# Patient Record
Sex: FEMALE | Race: WHITE | NOT HISPANIC OR LATINO | Employment: FULL TIME | URBAN - NONMETROPOLITAN AREA
[De-identification: names, ages, dates, MRNs, and addresses within clinical notes are randomized per-mention and may not be internally consistent; named-entity substitution may affect disease eponyms.]

---

## 2018-05-29 DIAGNOSIS — M25.562 LEFT KNEE PAIN, UNSPECIFIED CHRONICITY: Primary | ICD-10-CM

## 2018-05-30 ENCOUNTER — HOSPITAL ENCOUNTER (OUTPATIENT)
Dept: GENERAL RADIOLOGY | Facility: HOSPITAL | Age: 73
Discharge: HOME | End: 2018-05-30
Attending: ORTHOPAEDIC SURGERY

## 2018-05-30 ENCOUNTER — OFFICE VISIT (OUTPATIENT)
Dept: ORTHOPEDIC SURGERY | Facility: CLINIC | Age: 73
End: 2018-05-30

## 2018-05-30 VITALS
WEIGHT: 160 LBS | HEIGHT: 62 IN | DIASTOLIC BLOOD PRESSURE: 76 MMHG | BODY MASS INDEX: 29.44 KG/M2 | HEART RATE: 63 BPM | SYSTOLIC BLOOD PRESSURE: 180 MMHG

## 2018-05-30 DIAGNOSIS — M17.12 PRIMARY OSTEOARTHRITIS OF LEFT KNEE: Primary | ICD-10-CM

## 2018-05-30 DIAGNOSIS — M25.562 LEFT KNEE PAIN, UNSPECIFIED CHRONICITY: ICD-10-CM

## 2018-05-30 PROBLEM — M06.9 RHEUMATOID ARTHRITIS: Status: ACTIVE | Noted: 2018-05-30

## 2018-05-30 PROCEDURE — 20610 DRAIN/INJ JOINT/BURSA W/O US: CPT | Performed by: ORTHOPAEDIC SURGERY

## 2018-05-30 PROCEDURE — 99203 OFFICE O/P NEW LOW 30 MIN: CPT | Performed by: ORTHOPAEDIC SURGERY

## 2018-05-30 RX ORDER — LIDOCAINE HYDROCHLORIDE 20 MG/ML
2 INJECTION, SOLUTION INFILTRATION; PERINEURAL
Status: COMPLETED | OUTPATIENT
Start: 2018-05-30 | End: 2018-05-30

## 2018-05-30 RX ORDER — MELOXICAM 7.5 MG/1
7.5 TABLET ORAL DAILY
Refills: 0 | COMMUNITY
Start: 2018-03-16 | End: 2018-07-11

## 2018-05-30 RX ORDER — LISINOPRIL AND HYDROCHLOROTHIAZIDE 20; 12.5 MG/1; MG/1
1 TABLET ORAL DAILY
Refills: 0 | COMMUNITY
Start: 2018-05-17 | End: 2018-07-19 | Stop reason: HOSPADM

## 2018-05-30 RX ORDER — BRIMONIDINE TARTRATE/TIMOLOL 0.2%-0.5%
DROPS OPHTHALMIC (EYE)
Refills: 0 | COMMUNITY
Start: 2018-04-04 | End: 2018-07-11

## 2018-05-30 RX ORDER — METHYLPREDNISOLONE ACETATE 40 MG/ML
40 INJECTION, SUSPENSION INTRA-ARTICULAR; INTRALESIONAL; INTRAMUSCULAR; SOFT TISSUE
Status: COMPLETED | OUTPATIENT
Start: 2018-05-30 | End: 2018-05-30

## 2018-05-30 RX ADMIN — LIDOCAINE HYDROCHLORIDE 2 ML: 20 INJECTION, SOLUTION INFILTRATION; PERINEURAL at 14:42

## 2018-05-30 RX ADMIN — METHYLPREDNISOLONE ACETATE 40 MG: 40 INJECTION, SUSPENSION INTRA-ARTICULAR; INTRALESIONAL; INTRAMUSCULAR; SOFT TISSUE at 14:42

## 2018-05-30 NOTE — PROGRESS NOTES
New Patient Visit        Patient: Aydee Becker  YOB: 1945  Date of encounter: 5/30/2018      History of Present Illness:   Aydee Becker is a 72 y.o. female who is referred here today by Troi Bruce PA-C for evaluation of left knee pain.  She states the knee pain started about 6-8 weeks ago with no known injury.  She's complaining of anterior and medial knee pain.  She states pain is significantly worse with weightbearing activities and getting up and down from a seated position.  She also has difficulty with stairs.  She does complain of swelling with increased activity.  She denies catching or locking sensation.  She's been recently started on Voltaren gel and meloxicam minimal improvement.    PMH:   Patient Active Problem List   Diagnosis   (none) - all problems resolved or deleted     Past Medical History:   Diagnosis Date   • Hypertension    • Knee pain, left        PSH:  Past Surgical History:   Procedure Laterality Date   • CATARACT EXTRACTION Left        Allergies:   No Known Allergies    Medications:     Current Outpatient Prescriptions:   •  COMBIGAN 0.2-0.5 % ophthalmic solution, , Disp: , Rfl: 0  •  diclofenac (VOLTAREN) 1 % gel gel, APPLY 2 GRAM TO THE AFFECTED AREA 4 TIMES A DAY FOR 30 DAYS, Disp: , Rfl: 0  •  lisinopril-hydrochlorothiazide (PRINZIDE,ZESTORETIC) 20-12.5 MG per tablet, , Disp: , Rfl: 0  •  meloxicam (MOBIC) 7.5 MG tablet, Take 7.5 mg by mouth Daily., Disp: , Rfl: 0  •  sertraline (ZOLOFT) 50 MG tablet, , Disp: , Rfl: 0    Social History:  Social History     Social History   • Marital status:      Spouse name: N/A   • Number of children: N/A   • Years of education: N/A     Occupational History   • Not on file.     Social History Main Topics   • Smoking status: Never Smoker   • Smokeless tobacco: Never Used   • Alcohol use No   • Drug use: No   • Sexual activity: Not on file     Other Topics Concern   • Not on file     Social History Narrative   • No narrative  "on file       Family History:     Family History   Problem Relation Age of Onset   • Rheum arthritis Mother    • Hypertension Mother    • Rheum arthritis Father    • Gout Sister    • Gout Brother        Review of Systems:   Review of Systems   Constitutional: Negative.    HENT: Negative.    Eyes: Negative.    Respiratory: Negative.    Cardiovascular: Negative.    Gastrointestinal: Negative.    Endocrine: Negative.    Genitourinary: Negative.    Musculoskeletal: Positive for arthralgias, gait problem, joint swelling and myalgias.   Skin: Negative.    Hematological: Negative.    Psychiatric/Behavioral: Negative.        Physical Exam: 72 y.o. female  General Appearance:    Alert and oriented x 3, cooperative, in no acute distress                   Vitals:    05/30/18 1317   BP: 180/76   Pulse: 63   Weight: 72.6 kg (160 lb)   Height: 157.5 cm (62\")              Body mass index is 29.26 kg/m².        Musculoskeletal: Examination of the left knee reveals moderate effusion with moderate medial joint line tenderness.  She has full range of motion.  No instability with varus or valgus stressing.  Her neurovascular status is intact.    Radiology:     3 views of the left knee that are nonweightbearing were reviewed revealing mild to moderate osteoarthritic changes most prominent in the medial compartment.    Large Joint Arthrocentesis  Date/Time: 5/30/2018 2:42 PM  Consent given by: patient  Timeout: Immediately prior to procedure a time out was called to verify the correct patient, procedure, equipment, support staff and site/side marked as required   Supporting Documentation  Indications: pain and joint swelling   Procedure Details  Location: knee - L knee  Needle size: 25 G  Approach: anterolateral  Medications administered: 40 mg methylPREDNISolone acetate 40 MG/ML; 2 mL lidocaine 2%  Aspirate amount: 20 mL  Aspirate: serous  Patient tolerance: patient tolerated the procedure well with no immediate " complications        Assessment    ICD-10-CM ICD-9-CM   1. Primary osteoarthritis of left knee M17.12 715.16       Plan:   A 72-year-old female with complaints of left knee pain.  X-rays were reviewed today and although they were nonweightbearing films do show mild to moderate osteoarthritis mostly within the medial compartment.  On examination today she did have a moderate effusion and today we were able to aspirate approximately 20 cc of serous fluid.  We then proceeded with 40 mg of Depo-Medrol with lidocaine block intra-articular into the left knee.  She tolerated the procedure well.  We will monitor her response to the injection and she'll return back with any reoccurrence of pain.  Depending on her response to the cortisone she would be a good candidate for viscous supplementation.    Written by, Concepcion ACEVEDO, acting as a scribe for Dr. Moreno

## 2018-06-06 ENCOUNTER — HOSPITAL ENCOUNTER (OUTPATIENT)
Dept: GENERAL RADIOLOGY | Facility: HOSPITAL | Age: 73
Discharge: HOME | End: 2018-06-06
Attending: ORTHOPAEDIC SURGERY | Admitting: ORTHOPAEDIC SURGERY

## 2018-06-06 ENCOUNTER — OFFICE VISIT (OUTPATIENT)
Dept: ORTHOPEDIC SURGERY | Facility: CLINIC | Age: 73
End: 2018-06-06

## 2018-06-06 VITALS — WEIGHT: 156 LBS | BODY MASS INDEX: 28.71 KG/M2 | HEIGHT: 62 IN

## 2018-06-06 DIAGNOSIS — M17.12 PRIMARY OSTEOARTHRITIS OF LEFT KNEE: ICD-10-CM

## 2018-06-06 DIAGNOSIS — M25.562 LEFT KNEE PAIN, UNSPECIFIED CHRONICITY: ICD-10-CM

## 2018-06-06 DIAGNOSIS — M87.052 AVASCULAR NECROSIS OF MEDIAL FEMORAL CONDYLE, LEFT (HCC): Primary | ICD-10-CM

## 2018-06-06 PROCEDURE — 99213 OFFICE O/P EST LOW 20 MIN: CPT | Performed by: ORTHOPAEDIC SURGERY

## 2018-06-06 PROCEDURE — 73562 X-RAY EXAM OF KNEE 3: CPT

## 2018-06-06 PROCEDURE — 73562 X-RAY EXAM OF KNEE 3: CPT | Performed by: RADIOLOGY

## 2018-06-06 RX ORDER — HYDROCODONE BITARTRATE AND ACETAMINOPHEN 7.5; 325 MG/1; MG/1
1 TABLET ORAL EVERY 6 HOURS PRN
Qty: 30 TABLET | Refills: 0 | Status: SHIPPED | OUTPATIENT
Start: 2018-06-06 | End: 2018-07-11

## 2018-06-06 NOTE — PROGRESS NOTES
Follow-up Visit         Patient: Aydee Becker  YOB: 1945  Date of Encounter: 06/06/2018      HPI:  Aydee Becker, 73 y.o. female seen today in follow up left knee pain of about 10 weeks' duration.  She has had no trauma.  She experiences pain over the anteromedial aspect of her knee especially with weightbearing, difficulty getting out of a seated position, difficulty going up and down steps.  When last seen she was provided intra-articular injection of Depo-Medrol.  She had minimal if any relief.     Medical History:  Patient Active Problem List   Diagnosis   • Avascular necrosis of medial femoral condyle, left     Past Medical History:   Diagnosis Date   • Hypertension    • Knee pain, left        Social History:  Social History     Social History   • Marital status:      Spouse name: N/A   • Number of children: N/A   • Years of education: N/A     Occupational History   • Not on file.     Social History Main Topics   • Smoking status: Never Smoker   • Smokeless tobacco: Never Used   • Alcohol use No   • Drug use: No   • Sexual activity: Not on file     Other Topics Concern   • Not on file     Social History Narrative   • No narrative on file       Surgical History:  Past Surgical History:   Procedure Laterality Date   • CATARACT EXTRACTION Left        Radiology:    Radiographs obtained today left knee weightbearing show moderate osteoarthritis with lucency within the medial femoral condyle about 2 cm in diameter consistent with avascular necrosis.      Examination:  Left Knee: Mild effusion with moderate tenderness along the medial joint line.  Knee is otherwise with good mobility and no instability neurovascular intact.    Assessment:   73 y.o. female with repeat radiographs today demonstrating avascular necrosis superimposed on osteoarthritis right knee, AVN primarily medial femoral condyle.     Diagnosis Plan   1. Avascular necrosis of medial femoral condyle, left  MRI Knee Left  Without Contrast   2. Primary osteoarthritis of left knee  MRI Knee Left Without Contrast   3. Left knee pain, unspecified chronicity  MRI Knee Left Without Contrast       Plan:  She will follow up in approximately 1 week's time after MRI right knee is obtained.   She is given Norco 7.5 mg #30.    Cc:  Miki Bay MD    Scribed for Ignacio Moreno MD by Jeannine Moreno RN.10:20 AM 06/06/2018

## 2018-06-09 ENCOUNTER — HOSPITAL ENCOUNTER (OUTPATIENT)
Dept: MRI IMAGING | Facility: HOSPITAL | Age: 73
Discharge: HOME | End: 2018-06-09
Attending: ORTHOPAEDIC SURGERY | Admitting: ORTHOPAEDIC SURGERY

## 2018-06-09 DIAGNOSIS — M25.562 LEFT KNEE PAIN, UNSPECIFIED CHRONICITY: ICD-10-CM

## 2018-06-09 DIAGNOSIS — M17.12 PRIMARY OSTEOARTHRITIS OF LEFT KNEE: ICD-10-CM

## 2018-06-09 DIAGNOSIS — M87.052 AVASCULAR NECROSIS OF MEDIAL FEMORAL CONDYLE, LEFT (HCC): ICD-10-CM

## 2018-06-09 PROCEDURE — 73721 MRI JNT OF LWR EXTRE W/O DYE: CPT | Performed by: RADIOLOGY

## 2018-06-09 PROCEDURE — 73721 MRI JNT OF LWR EXTRE W/O DYE: CPT

## 2018-06-13 ENCOUNTER — OFFICE VISIT (OUTPATIENT)
Dept: ORTHOPEDIC SURGERY | Facility: CLINIC | Age: 73
End: 2018-06-13

## 2018-06-13 DIAGNOSIS — M17.12 PRIMARY OSTEOARTHRITIS OF LEFT KNEE: ICD-10-CM

## 2018-06-13 DIAGNOSIS — M87.052 AVASCULAR NECROSIS OF MEDIAL FEMORAL CONDYLE, LEFT (HCC): Primary | ICD-10-CM

## 2018-06-13 PROBLEM — I10 HYPERTENSION: Status: ACTIVE | Noted: 2018-06-13

## 2018-06-13 PROCEDURE — 99214 OFFICE O/P EST MOD 30 MIN: CPT | Performed by: ORTHOPAEDIC SURGERY

## 2018-06-13 NOTE — PROGRESS NOTES
History and Physical    Patient: Aydee Becker  YOB: 1945  Date of encounter: 06/13/2018      History of Present Illness:   Aydee Becker is a 73 y.o. female who returns here today for follow-up of left knee pain.  She's had worsening knee pain over the last 3 months over the anterior medial aspect of the knee.  Her symptoms continue to get worse with weightbearing activities as well as getting up and down out of a seated position.  In addition of this she has difficulty with stairs and squatting.  She's previously had intra-articular steroid injection without improvement.  She's recently undergone MRI and presents here today to review this.    PMH:   Patient Active Problem List   Diagnosis   • Avascular necrosis of medial femoral condyle, left   • Hypertension   • Glaucoma   • Primary osteoarthritis of left knee     Past Medical History:   Diagnosis Date   • Hypertension    • Knee pain, left        PSH:  Past Surgical History:   Procedure Laterality Date   • CATARACT EXTRACTION Left     with stent placement for glaucoma       Allergies:   No Known Allergies    Medications:     Current Outpatient Prescriptions:   •  COMBIGAN 0.2-0.5 % ophthalmic solution, , Disp: , Rfl: 0  •  diclofenac (VOLTAREN) 1 % gel gel, APPLY 2 GRAM TO THE AFFECTED AREA 4 TIMES A DAY FOR 30 DAYS, Disp: , Rfl: 0  •  HYDROcodone-acetaminophen (NORCO) 7.5-325 MG per tablet, Take 1 tablet by mouth Every 6 (Six) Hours As Needed for Moderate Pain  for up to 30 doses., Disp: 30 tablet, Rfl: 0  •  lisinopril-hydrochlorothiazide (PRINZIDE,ZESTORETIC) 20-12.5 MG per tablet, , Disp: , Rfl: 0  •  meloxicam (MOBIC) 7.5 MG tablet, Take 7.5 mg by mouth Daily., Disp: , Rfl: 0  •  sertraline (ZOLOFT) 50 MG tablet, , Disp: , Rfl: 0    Social History:     Social History     Occupational History   • Not on file.     Social History Main Topics   • Smoking status: Never Smoker   • Smokeless tobacco: Never Used   • Alcohol use No   • Drug use: No  "  • Sexual activity: Not on file      Social History     Social History Narrative   • No narrative on file       Family History:     Family History   Problem Relation Age of Onset   • Rheum arthritis Mother    • Hypertension Mother    • Rheum arthritis Father    • Gout Sister    • Gout Brother        Review of Systems:   Review of Systems   Constitutional: Negative.    HENT: Negative.    Eyes:        Positive for Glaucoma   Respiratory: Negative.    Cardiovascular: Negative.    Gastrointestinal: Negative.    Endocrine: Negative.    Genitourinary: Negative.    Musculoskeletal: Positive for arthralgias, gait problem and joint swelling.   Skin: Negative.    Hematological: Negative.    Psychiatric/Behavioral: Negative.        Physical Exam:   Constitutional: Patient is oriented to person, place, and time. Patient appears well-developed and well-nourished. No acute distress.   Vitals:    06/13/18 1451   BP: 169/75   Pulse: 62   Weight: 70.8 kg (156 lb 1.4 oz)   Height: 157.5 cm (62\")       HENT:   Head: Normocephalic and atraumatic.   Right Ear: External ear normal.   Left Ear: External ear normal.   Eyes: EOM are normal. Right eye exhibits no discharge. Left eye exhibits no discharge.   Neck: Normal range of motion. Neck supple.   Cardiovascular: Regular rhythm and normal heart sounds.    No murmur heard.  Pulmonary/Chest: Effort normal. No respiratory distress.Clear to ascultation bilaterally.  Abdominal: Soft.   Musculoskeletal:Examination of the left knee reveals mild effusion with moderate tenderness along the medial joint line.  She has full range of motion with significant crepitus.  There is no instability with varus or valgus stressing.  Her neurovascular status is intact.    Neurological: Patient is alert and oriented to person, place, and time.   Skin: Skin is warm and dry. No rash noted. Patient is not diaphoretic.   Psychiatric: Patinet has a normal mood and affect. Patients behavior is normal. Thought " content normal.     Radiology:     Previous AP and lateral standing views of the knee were again reviewed revealing moderate osteoarthritic changes with lucency within the medial femoral condyle.    MRI of the left knee was reviewed revealing a degenerative type tear involving the posterior horn of the medial meniscus with stage III avascular necrosis of the medial femoral condyle with subchondral fracture line noted.  There is subadjacent marrow edema.  There is also moderate to advanced medial knee compartment osteoarthritis.    Assessment    ICD-10-CM ICD-9-CM   1. Avascular necrosis of medial femoral condyle, left M87.052 733.43   2. Primary osteoarthritis of left knee M17.12 715.16       Plan:   A 73-year-old female with radiographs and now an MRI that demonstrates avascular necrosis superimposed on her osteoarthritis of the right knee.  She's not responding to conservative measures and pain is getting progressively worse and impinging on activities of daily living.  She is now ambulating with a lidocaine because of the pain.  Given this we have discussed proceeding with a left total knee arthroplasty.  We discussed the risks, benefits, and future outcomes of surgery.  She accepts these risks and is agreeable to surgery.  We will need to obtain medical clearance through Dr. Bay.  Once this has been obtained we will give her a date for surgery.    Written by, Concepcion ACEVEDO, acting as a scribe for Dr. Moreno

## 2018-06-14 VITALS
WEIGHT: 156.09 LBS | DIASTOLIC BLOOD PRESSURE: 75 MMHG | HEIGHT: 62 IN | SYSTOLIC BLOOD PRESSURE: 169 MMHG | HEART RATE: 62 BPM | BODY MASS INDEX: 28.72 KG/M2

## 2018-06-14 PROBLEM — M17.12 PRIMARY OSTEOARTHRITIS OF LEFT KNEE: Status: ACTIVE | Noted: 2018-06-14

## 2018-06-14 PROBLEM — H40.9 GLAUCOMA: Status: ACTIVE | Noted: 2018-06-14

## 2018-06-26 ENCOUNTER — HOSPITAL ENCOUNTER (OUTPATIENT)
Dept: CARDIOLOGY | Facility: HOSPITAL | Age: 73
Discharge: HOME | End: 2018-06-26
Attending: INTERNAL MEDICINE | Admitting: INTERNAL MEDICINE

## 2018-06-26 ENCOUNTER — OFFICE VISIT (OUTPATIENT)
Dept: CARDIOLOGY | Facility: CLINIC | Age: 73
End: 2018-06-26

## 2018-06-26 VITALS
BODY MASS INDEX: 28.67 KG/M2 | RESPIRATION RATE: 16 BRPM | WEIGHT: 155.8 LBS | DIASTOLIC BLOOD PRESSURE: 68 MMHG | HEART RATE: 67 BPM | SYSTOLIC BLOOD PRESSURE: 125 MMHG | HEIGHT: 62 IN

## 2018-06-26 DIAGNOSIS — R94.31 ABNORMAL ECG: Primary | ICD-10-CM

## 2018-06-26 DIAGNOSIS — R01.1 MURMUR: ICD-10-CM

## 2018-06-26 DIAGNOSIS — Z01.810 PREOP CARDIOVASCULAR EXAM: ICD-10-CM

## 2018-06-26 PROCEDURE — 99214 OFFICE O/P EST MOD 30 MIN: CPT | Performed by: INTERNAL MEDICINE

## 2018-06-26 PROCEDURE — 93306 TTE W/DOPPLER COMPLETE: CPT

## 2018-06-26 PROCEDURE — 93306 TTE W/DOPPLER COMPLETE: CPT | Performed by: INTERNAL MEDICINE

## 2018-06-26 PROCEDURE — 93000 ELECTROCARDIOGRAM COMPLETE: CPT | Performed by: INTERNAL MEDICINE

## 2018-06-26 NOTE — PROGRESS NOTES
Miki Bay MD  Aydee Becker  : 1945  DATE:2018    Patient Active Problem List   Diagnosis   • Avascular necrosis of medial femoral condyle, left   • Hypertension   • Glaucoma   • Primary osteoarthritis of left knee   • Murmur   • Abnormal ECG   • Preop cardiovascular exam       Dear Miki Bay MD:    Titus Becker is a 73 y.o. female with the above medical problems who is being seen for consultation today at the request of Miki Bay MD. The patient is a 73-year-old white female with a history of hypertension, left knee arthritis and glaucoma who comes to the clinic for preoperative evaluation.  According to the patient she was seen at her primary care provider's office and an EKG was done.  This was noted to be abnormal and the patient was referred to cardiology for further evaluation.  An EKG done during this admission shows that the patient has sinus rhythm with first-degree AV block which is not significant.  According to the patient she was able to walk 3 miles up to a recently.  She is unable to do so right now because of her knee pain.  She denies any history of cardiac problems.  She denies any chest pain, shortness breath, palpitations, orthopnea, PND, lower cavity edema, dizziness or syncope.  She does have a history of hypertension which appears to be well-controlled on current regimen.      Past Medical History:   Diagnosis Date   • Hypertension    • Knee pain, left        Past Surgical History:   Procedure Laterality Date   • CATARACT EXTRACTION Left     with stent placement for glaucoma       Family History   Problem Relation Age of Onset   • Rheum arthritis Mother    • Hypertension Mother    • Rheum arthritis Father    • Gout Sister    • Gout Brother        Social History     Social History   • Marital status:      Spouse name: N/A   • Number of children: N/A   • Years of education: N/A     Occupational History   • Not on file.      Social History Main Topics   • Smoking status: Never Smoker   • Smokeless tobacco: Never Used   • Alcohol use No   • Drug use: No   • Sexual activity: Defer     Other Topics Concern   • Not on file     Social History Narrative   • No narrative on file         Current Outpatient Prescriptions:   •  COMBIGAN 0.2-0.5 % ophthalmic solution, , Disp: , Rfl: 0  •  diclofenac (VOLTAREN) 1 % gel gel, APPLY 2 GRAM TO THE AFFECTED AREA 4 TIMES A DAY FOR 30 DAYS, Disp: , Rfl: 0  •  HYDROcodone-acetaminophen (NORCO) 7.5-325 MG per tablet, Take 1 tablet by mouth Every 6 (Six) Hours As Needed for Moderate Pain  for up to 30 doses., Disp: 30 tablet, Rfl: 0  •  lisinopril-hydrochlorothiazide (PRINZIDE,ZESTORETIC) 20-12.5 MG per tablet, , Disp: , Rfl: 0  •  meloxicam (MOBIC) 7.5 MG tablet, Take 7.5 mg by mouth Daily., Disp: , Rfl: 0  •  sertraline (ZOLOFT) 50 MG tablet, , Disp: , Rfl: 0    The following portions of the patient's history were reviewed and updated as appropriate: allergies, current medications, past family history, past medical history, past social history, past surgical history and problem list.    Review of Systems   Constitution: Negative for diaphoresis, fever, weakness, malaise/fatigue, weight gain and weight loss.   HENT: Negative for congestion, ear discharge, ear pain, hearing loss, hoarse voice, nosebleeds, sore throat and tinnitus.    Eyes: Negative for blurred vision, discharge, double vision and pain.   Cardiovascular: Negative for chest pain, dyspnea on exertion, irregular heartbeat, leg swelling and palpitations.   Respiratory: Negative for cough, hemoptysis, shortness of breath and wheezing.    Endocrine: Negative for cold intolerance, heat intolerance, polydipsia, polyphagia and polyuria.   Hematologic/Lymphatic: Negative for bleeding problem. Does not bruise/bleed easily.   Skin: Negative for color change, dry skin, flushing, itching and rash.   Musculoskeletal: Positive for arthritis, joint pain  "and stiffness. Negative for back pain, joint swelling, muscle cramps, muscle weakness and neck pain.   Gastrointestinal: Negative for abdominal pain, change in bowel habit, constipation, diarrhea, heartburn, nausea and vomiting.   Genitourinary: Negative for bladder incontinence, decreased libido, dysuria, frequency and hematuria.   Neurological: Negative for disturbances in coordination, dizziness, focal weakness, headaches, light-headedness, loss of balance, numbness, paresthesias and tremors.   Psychiatric/Behavioral: Negative for altered mental status, depression and memory loss. The patient does not have insomnia.    Allergic/Immunologic: Negative for hives.       Objective   Blood pressure 125/68, pulse 67, resp. rate 16, height 157.5 cm (62.01\"), weight 70.7 kg (155 lb 12.8 oz).    Physical Exam   Constitutional: She is oriented to person, place, and time. She appears well-developed and well-nourished.   WF sitting comfortably on chair.   HENT:   Mouth/Throat: Oropharynx is clear and moist.   Eyes: EOM are normal. Pupils are equal, round, and reactive to light.   Neck: Neck supple. No JVD present. No tracheal deviation present. No thyromegaly present.   Cardiovascular: Normal rate, regular rhythm, S1 normal and S2 normal.  Exam reveals no gallop and no friction rub.    Murmur heard.   Harsh midsystolic murmur is present with a grade of 2/6  at the upper right sternal border radiating to the neck  Pulmonary/Chest: Effort normal and breath sounds normal. No respiratory distress. She has no wheezes. She has no rales.   Abdominal: Soft. Bowel sounds are normal. She exhibits no mass. There is no tenderness.   Musculoskeletal: She exhibits no edema.   Decreased range of motion of left knee.   Lymphadenopathy:     She has no cervical adenopathy.   Neurological: She is alert and oriented to person, place, and time.   Skin: Skin is warm and dry. No rash noted.   Psychiatric: She has a normal mood and affect. "         ECG 12 Lead  Date/Time: 6/26/2018 8:33 AM  Performed by: ARIANNA FELIZ  Authorized by: ARIANNA FELIZ   Comparison: not compared with previous ECG   Previous ECG: no previous ECG available  Rhythm: sinus rhythm  Rate: normal  BPM: 63  Conduction: 1st degree  ST Segments: ST segments normal  T Waves: T waves normal  QRS axis: normal  Other: no other findings  Clinical impression: abnormal ECG            Assessment/Plan      1.  Abnormal EKG: Patient with a history of abnormal EKG that appears to show sinus rhythm with first degree AV block on recheck during this visit.  This is nonsignificant.  No further testing is required for this problem.    2.  Murmur: Patient with a newly diagnosed murmur that appears to be related to aortic stenosis.  According to the patient she has never been told she had a murmur in the past.  This point will need to evaluate further with echocardiogram.    3.  Preoperative cardiovascular evaluation: Patient here for preoperative cardiovascular evaluation for left-sided knee replacement.  According to the patient she was able to walk up to 3 miles recently and denies any cardiac symptoms.  However with the newly diagnosed murmur that has not been evaluated previously we will need to order an echocardiogram to evaluate for aortic stenosis.  Unless aortic stenosis is severe she would be okay to proceed with surgery.  However will wait to provide clearance until the echocardiogram is done.       Diagnosis Plan   1. Abnormal ECG  ECG 12 Lead   2. Murmur  Adult Transthoracic Echo Complete W/ Cont if Necessary Per Protocol   3. Preop cardiovascular exam              Return in about 4 weeks (around 7/24/2018).    I appreciate the opportunity to participate in this patient's cardiovascular care.    Best Regards    Arianna Lao

## 2018-06-27 ENCOUNTER — APPOINTMENT (OUTPATIENT)
Dept: CARDIOLOGY | Facility: HOSPITAL | Age: 73
End: 2018-06-27
Attending: INTERNAL MEDICINE

## 2018-06-27 LAB
BH CV ECHO MEAS - % IVS THICK: -5 %
BH CV ECHO MEAS - % LVPW THICK: 28.5 %
BH CV ECHO MEAS - ACS: 1.7 CM
BH CV ECHO MEAS - AO MAX PG (FULL): 12.1 MMHG
BH CV ECHO MEAS - AO MAX PG: 15.3 MMHG
BH CV ECHO MEAS - AO MEAN PG (FULL): 4.7 MMHG
BH CV ECHO MEAS - AO MEAN PG: 6.3 MMHG
BH CV ECHO MEAS - AO ROOT AREA (BSA CORRECTED): 1.9
BH CV ECHO MEAS - AO ROOT AREA: 7.9 CM^2
BH CV ECHO MEAS - AO ROOT DIAM: 3.2 CM
BH CV ECHO MEAS - AO V2 MAX: 195.5 CM/SEC
BH CV ECHO MEAS - AO V2 MEAN: 113.5 CM/SEC
BH CV ECHO MEAS - AO V2 VTI: 44 CM
BH CV ECHO MEAS - AVA(I,A): 1.7 CM^2
BH CV ECHO MEAS - AVA(I,D): 1.7 CM^2
BH CV ECHO MEAS - AVA(V,A): 1.6 CM^2
BH CV ECHO MEAS - AVA(V,D): 1.6 CM^2
BH CV ECHO MEAS - BSA(HAYCOCK): 1.8 M^2
BH CV ECHO MEAS - BSA: 1.7 M^2
BH CV ECHO MEAS - BZI_BMI: 28.4 KILOGRAMS/M^2
BH CV ECHO MEAS - BZI_METRIC_HEIGHT: 157.5 CM
BH CV ECHO MEAS - BZI_METRIC_WEIGHT: 70.3 KG
BH CV ECHO MEAS - CONTRAST EF 4CH: 68.8 ML/M^2
BH CV ECHO MEAS - EDV(CUBED): 97.4 ML
BH CV ECHO MEAS - EDV(MOD-SP4): 48 ML
BH CV ECHO MEAS - EDV(TEICH): 97.4 ML
BH CV ECHO MEAS - EF(CUBED): 65.1 %
BH CV ECHO MEAS - EF(MOD-SP4): 68.8 %
BH CV ECHO MEAS - EF(TEICH): 56.7 %
BH CV ECHO MEAS - ESV(CUBED): 34 ML
BH CV ECHO MEAS - ESV(MOD-SP4): 15 ML
BH CV ECHO MEAS - ESV(TEICH): 42.2 ML
BH CV ECHO MEAS - FS: 29.6 %
BH CV ECHO MEAS - IVS/LVPW: 1.1
BH CV ECHO MEAS - IVSD: 1.1 CM
BH CV ECHO MEAS - IVSS: 1.1 CM
BH CV ECHO MEAS - LA DIMENSION: 2.7 CM
BH CV ECHO MEAS - LA/AO: 0.85
BH CV ECHO MEAS - LV DIASTOLIC VOL/BSA (35-75): 28 ML/M^2
BH CV ECHO MEAS - LV MASS(C)D: 171.8 GRAMS
BH CV ECHO MEAS - LV MASS(C)DI: 100.2 GRAMS/M^2
BH CV ECHO MEAS - LV MASS(C)S: 117.3 GRAMS
BH CV ECHO MEAS - LV MASS(C)SI: 68.4 GRAMS/M^2
BH CV ECHO MEAS - LV MAX PG: 3.2 MMHG
BH CV ECHO MEAS - LV MEAN PG: 1.6 MMHG
BH CV ECHO MEAS - LV SYSTOLIC VOL/BSA (12-30): 8.7 ML/M^2
BH CV ECHO MEAS - LV V1 MAX: 88.8 CM/SEC
BH CV ECHO MEAS - LV V1 MEAN: 57.4 CM/SEC
BH CV ECHO MEAS - LV V1 VTI: 20.6 CM
BH CV ECHO MEAS - LVIDD: 4.6 CM
BH CV ECHO MEAS - LVIDS: 3.2 CM
BH CV ECHO MEAS - LVLD AP4: 5.7 CM
BH CV ECHO MEAS - LVLS AP4: 4.7 CM
BH CV ECHO MEAS - LVOT AREA (M): 3.5 CM^2
BH CV ECHO MEAS - LVOT AREA: 3.5 CM^2
BH CV ECHO MEAS - LVOT DIAM: 2.1 CM
BH CV ECHO MEAS - LVPWD: 1 CM
BH CV ECHO MEAS - LVPWS: 1.3 CM
BH CV ECHO MEAS - MV A MAX VEL: 106.1 CM/SEC
BH CV ECHO MEAS - MV E MAX VEL: 70.6 CM/SEC
BH CV ECHO MEAS - MV E/A: 0.67
BH CV ECHO MEAS - PA ACC SLOPE: 1391 CM/SEC^2
BH CV ECHO MEAS - PA ACC TIME: 0.1 SEC
BH CV ECHO MEAS - PA PR(ACCEL): 34.6 MMHG
BH CV ECHO MEAS - RAP SYSTOLE: 10 MMHG
BH CV ECHO MEAS - RVDD: 0.92 CM
BH CV ECHO MEAS - RVSP: 39.2 MMHG
BH CV ECHO MEAS - SI(AO): 202.8 ML/M^2
BH CV ECHO MEAS - SI(CUBED): 37 ML/M^2
BH CV ECHO MEAS - SI(LVOT): 42.5 ML/M^2
BH CV ECHO MEAS - SI(MOD-SP4): 19.2 ML/M^2
BH CV ECHO MEAS - SI(TEICH): 32.2 ML/M^2
BH CV ECHO MEAS - SV(AO): 347.8 ML
BH CV ECHO MEAS - SV(CUBED): 63.4 ML
BH CV ECHO MEAS - SV(LVOT): 72.9 ML
BH CV ECHO MEAS - SV(MOD-SP4): 33 ML
BH CV ECHO MEAS - SV(TEICH): 55.2 ML
BH CV ECHO MEAS - TR MAX VEL: 270.1 CM/SEC
LV EF 2D ECHO EST: 65 %
MAXIMAL PREDICTED HEART RATE: 146 BPM
STRESS TARGET HR: 124 BPM

## 2018-07-02 ENCOUNTER — TELEPHONE (OUTPATIENT)
Dept: CARDIOLOGY | Facility: CLINIC | Age: 73
End: 2018-07-02

## 2018-07-02 NOTE — TELEPHONE ENCOUNTER
Called and informed patients' daughter that Dr. Lao sent a letter of clearance to Ms Steward' physician for surgery clearance.

## 2018-07-06 ENCOUNTER — TELEPHONE (OUTPATIENT)
Dept: ORTHOPEDIC SURGERY | Facility: CLINIC | Age: 73
End: 2018-07-06

## 2018-07-06 NOTE — TELEPHONE ENCOUNTER
Patient called ask what she could take for knee pain and swelling, advised patient she can take OTC motrin, elevate, ice and rest her knee.  Patient verbalized understanding.

## 2018-07-09 ENCOUNTER — PREP FOR SURGERY (OUTPATIENT)
Dept: OTHER | Facility: HOSPITAL | Age: 73
End: 2018-07-09

## 2018-07-09 DIAGNOSIS — M17.12 PRIMARY OSTEOARTHRITIS OF LEFT KNEE: Primary | ICD-10-CM

## 2018-07-10 ENCOUNTER — TELEPHONE (OUTPATIENT)
Dept: ORTHOPEDIC SURGERY | Facility: CLINIC | Age: 73
End: 2018-07-10

## 2018-07-10 NOTE — TELEPHONE ENCOUNTER
Patient aware of PAT 7-11-18 @ 7:45 follow up with Dr Moreno in his office after at 9:20.  Surgery is 7-17-18.

## 2018-07-11 ENCOUNTER — OFFICE VISIT (OUTPATIENT)
Dept: ORTHOPEDIC SURGERY | Facility: CLINIC | Age: 73
End: 2018-07-11

## 2018-07-11 ENCOUNTER — APPOINTMENT (OUTPATIENT)
Dept: PREADMISSION TESTING | Facility: HOSPITAL | Age: 73
End: 2018-07-11

## 2018-07-11 ENCOUNTER — PREP FOR SURGERY (OUTPATIENT)
Dept: OTHER | Facility: HOSPITAL | Age: 73
End: 2018-07-11

## 2018-07-11 VITALS
HEIGHT: 63 IN | WEIGHT: 156 LBS | HEART RATE: 68 BPM | BODY MASS INDEX: 27.64 KG/M2 | DIASTOLIC BLOOD PRESSURE: 78 MMHG | SYSTOLIC BLOOD PRESSURE: 140 MMHG

## 2018-07-11 VITALS — BODY MASS INDEX: 28.53 KG/M2 | WEIGHT: 156 LBS

## 2018-07-11 DIAGNOSIS — M17.12 PRIMARY OSTEOARTHRITIS OF LEFT KNEE: ICD-10-CM

## 2018-07-11 DIAGNOSIS — M87.052 AVASCULAR NECROSIS OF MEDIAL FEMORAL CONDYLE, LEFT (HCC): Primary | ICD-10-CM

## 2018-07-11 DIAGNOSIS — M25.562 LEFT KNEE PAIN, UNSPECIFIED CHRONICITY: ICD-10-CM

## 2018-07-11 LAB
ABO GROUP BLD: NORMAL
ANION GAP SERPL CALCULATED.3IONS-SCNC: 7.8 MMOL/L (ref 3.6–11.2)
BASOPHILS # BLD AUTO: 0.04 10*3/MM3 (ref 0–0.3)
BASOPHILS NFR BLD AUTO: 0.7 % (ref 0–2)
BLD GP AB SCN SERPL QL: NEGATIVE
BUN BLD-MCNC: 15 MG/DL (ref 7–21)
BUN/CREAT SERPL: 19.5 (ref 7–25)
CALCIUM SPEC-SCNC: 9.7 MG/DL (ref 7.7–10)
CHLORIDE SERPL-SCNC: 101 MMOL/L (ref 99–112)
CO2 SERPL-SCNC: 27.2 MMOL/L (ref 24.3–31.9)
CREAT BLD-MCNC: 0.77 MG/DL (ref 0.43–1.29)
DEPRECATED RDW RBC AUTO: 43.6 FL (ref 37–54)
EOSINOPHIL # BLD AUTO: 0.43 10*3/MM3 (ref 0–0.7)
EOSINOPHIL NFR BLD AUTO: 7.3 % (ref 0–7)
ERYTHROCYTE [DISTWIDTH] IN BLOOD BY AUTOMATED COUNT: 13.3 % (ref 11.5–14.5)
GFR SERPL CREATININE-BSD FRML MDRD: 73 ML/MIN/1.73
GLUCOSE BLD-MCNC: 79 MG/DL (ref 70–110)
HCT VFR BLD AUTO: 41.7 % (ref 37–47)
HGB BLD-MCNC: 13.8 G/DL (ref 12–16)
IMM GRANULOCYTES # BLD: 0.01 10*3/MM3 (ref 0–0.03)
IMM GRANULOCYTES NFR BLD: 0.2 % (ref 0–0.5)
LYMPHOCYTES # BLD AUTO: 1.2 10*3/MM3 (ref 1–3)
LYMPHOCYTES NFR BLD AUTO: 20.2 % (ref 16–46)
MCH RBC QN AUTO: 30.3 PG (ref 27–33)
MCHC RBC AUTO-ENTMCNC: 33.1 G/DL (ref 33–37)
MCV RBC AUTO: 91.6 FL (ref 80–94)
MONOCYTES # BLD AUTO: 0.54 10*3/MM3 (ref 0.1–0.9)
MONOCYTES NFR BLD AUTO: 9.1 % (ref 0–12)
MRSA DNA SPEC QL NAA+PROBE: NEGATIVE
NEUTROPHILS # BLD AUTO: 3.71 10*3/MM3 (ref 1.4–6.5)
NEUTROPHILS NFR BLD AUTO: 62.5 % (ref 40–75)
OSMOLALITY SERPL CALC.SUM OF ELEC: 271.7 MOSM/KG (ref 273–305)
PLATELET # BLD AUTO: 261 10*3/MM3 (ref 130–400)
PMV BLD AUTO: 10.3 FL (ref 6–10)
POTASSIUM BLD-SCNC: 3.7 MMOL/L (ref 3.5–5.3)
RBC # BLD AUTO: 4.55 10*6/MM3 (ref 4.2–5.4)
RH BLD: POSITIVE
S AUREUS DNA SPEC QL NAA+PROBE: NEGATIVE
SODIUM BLD-SCNC: 136 MMOL/L (ref 135–153)
T&S EXPIRATION DATE: NORMAL
WBC NRBC COR # BLD: 5.93 10*3/MM3 (ref 4.5–12.5)

## 2018-07-11 PROCEDURE — 87640 STAPH A DNA AMP PROBE: CPT | Performed by: ORTHOPAEDIC SURGERY

## 2018-07-11 PROCEDURE — 99214 OFFICE O/P EST MOD 30 MIN: CPT | Performed by: ORTHOPAEDIC SURGERY

## 2018-07-11 PROCEDURE — 36415 COLL VENOUS BLD VENIPUNCTURE: CPT

## 2018-07-11 PROCEDURE — 86901 BLOOD TYPING SEROLOGIC RH(D): CPT | Performed by: ORTHOPAEDIC SURGERY

## 2018-07-11 PROCEDURE — 85025 COMPLETE CBC W/AUTO DIFF WBC: CPT | Performed by: ORTHOPAEDIC SURGERY

## 2018-07-11 PROCEDURE — 87641 MR-STAPH DNA AMP PROBE: CPT | Performed by: ORTHOPAEDIC SURGERY

## 2018-07-11 PROCEDURE — 86900 BLOOD TYPING SEROLOGIC ABO: CPT | Performed by: ORTHOPAEDIC SURGERY

## 2018-07-11 PROCEDURE — 80048 BASIC METABOLIC PNL TOTAL CA: CPT | Performed by: ORTHOPAEDIC SURGERY

## 2018-07-11 PROCEDURE — 86850 RBC ANTIBODY SCREEN: CPT | Performed by: ORTHOPAEDIC SURGERY

## 2018-07-11 RX ORDER — HYDROCODONE BITARTRATE AND ACETAMINOPHEN 7.5; 325 MG/1; MG/1
0.5 TABLET ORAL 2 TIMES DAILY PRN
COMMUNITY
End: 2018-07-11

## 2018-07-11 RX ORDER — HYDROCODONE BITARTRATE AND ACETAMINOPHEN 7.5; 325 MG/1; MG/1
1 TABLET ORAL EVERY 6 HOURS PRN
Qty: 20 TABLET | Refills: 0 | Status: SHIPPED | OUTPATIENT
Start: 2018-07-11

## 2018-07-11 ASSESSMENT — KOOS JR
KOOS JR SCORE: 22
KOOS JR SCORE: 31.307

## 2018-07-11 NOTE — DISCHARGE INSTRUCTIONS
TAKE the following medications the morning of surgery:  All heart or blood pressure medications    HOLD all diabetic medications the morning of surgery as ordered by physician.    Please discontinue all blood thinners and anticoagulants (except aspirin) prior to surgery as per your surgeon and cardiologist instructions.  Aspirin may be continued up to the day prior to surgery.     CHLORHEXIDINE CLOTHS GIVEN WITH INSTRUCTIONS AND FORM TO RETURN TO HOSPITAL    General Instructions:  · Do not eat or drink after midnight: includes water, mints, or gum. You may brush your teeth.  Dental appliances that are removable must be taken out day of surgery.  · Do not smoke, chew tobacco, or drink alcohol.  · Bring medications in original bottles, any inhalers and if applicable your C-PAP/BI-PAP machine.  · Bring any papers given to you in the doctor's office.  · Wear clean comfortable clothes and socks.  · Do not wear contact lenses or make-up. Bring a case for your glasses if applicable.  · Bring crutches or walker if applicable.  · Leave all other valuables and jewelry at home.    If you were given a blood bank ID arm band remember to bring it with you the day of surgery.    Preventing a Surgical Site Infection:  Shower the night before surgery (unless instructed other wise) using a fresh bar of anti-bacterial soap (such as Dial) and clean washcloth. Dry with a clean towel and dress in clean clothing.  For 2 to 3 days before surgery, avoid shaving with a razor near where you will have surgery because the razor can irritate skin and make it easier to develop an infection. Ask your surgeon if you will be receiving antibiotics prior to surgery.  Make sure you, your family, and all healthcare providers clear their hands with soap and water or an alcohol-based hand  before caring for you or your wound.  If at all possible, quit smoking as many days before surgery as you can.    Day of surgery:  Upon arrival, a Pre-op nurse  and Anesthesiologist will review your health history, obtain vital signs, and answer questions you may have. The only belongings needed at this time will be your home medications and if applicable your C-PAP/BI-PAP machine. If you are staying overnight your family can leave the rest of your belongings in the car and bring them to your room later. A Pre-op nurse will start an IV and you may receive medication in preparation for surgery, including something to help you relax. Your family will be able to see you in the Pre-op area. While you are in surgery your family should notify the waiting room  if they leave the waiting room area and provide a contact phone number.    Please be aware that surgery does come with discomfort. We want to make every effort to control your discomfort so please discuss any uncontrolled symptoms with your nurse. Your doctor will most likely have prescribed pain medications.    If you are going home after surgery you will receive individualized written care instructions before being discharged. A responsible adult must drive you to and from the hospital on the day of surgery and stay with you for 24 hours.    If you are staying overnight following surgery, you will be transported to your hospital room following the recovery period.  [facility] has all private rooms.    If you have any questions please call Pre-Admission Testing at [phone].  Deductibles and co-payments are collected on the day of service. Please be prepared to pay the required co-pay, deductible or deposit on the day of service as defined by your plan.

## 2018-07-11 NOTE — PROGRESS NOTES
History and Physical      Patient: Aydee Becker  YOB: 1945  Date of Encounter: 07/11/2018      HPI:   Aydee Becker, 73 y.o. female, returns today for pre-operative surgical update for left total knee arthroplasty scheduled 07/17/2018. She has completed medical and cardiac clearance.  Her symptoms remain the same.  She is severely limited due to her knee pain.  She wishes to remain active and work but she is not able to do so.  She continues to have significant pain with weightbearing activities.  She also has difficulty getting out of a squatted position such as a chair and she has had intra-articular steroid injection without improvement.  MRI obtained in the past shows large area of avascular necrosis medial femoral condyle.    Active Problem List:  Patient Active Problem List   Diagnosis   • Avascular necrosis of medial femoral condyle, left (CMS/HCC)   • Hypertension   • Glaucoma   • Primary osteoarthritis of left knee   • Murmur   • Abnormal ECG   • Preop cardiovascular exam       Past Medical History:  Past Medical History:   Diagnosis Date   • Arthritis    • Hypertension    • Knee pain, left        Past Surgical History:  Past Surgical History:   Procedure Laterality Date   • CATARACT EXTRACTION Left     with stent placement for glaucoma       Family History:  Family History   Problem Relation Age of Onset   • Rheum arthritis Mother    • Hypertension Mother    • Rheum arthritis Father    • Gout Sister    • Gout Brother        Social History:  Social History     Social History   • Marital status:      Spouse name: N/A   • Number of children: N/A   • Years of education: N/A     Occupational History   • Not on file.     Social History Main Topics   • Smoking status: Never Smoker   • Smokeless tobacco: Never Used   • Alcohol use No   • Drug use: No   • Sexual activity: Defer     Other Topics Concern   • Not on file     Social History Narrative   • No narrative on file     Patient's  BMI  is above normal parameters. Recommendations include: educational material.      Medications:  Current Outpatient Prescriptions   Medication Sig Dispense Refill   • HYDROcodone-acetaminophen (NORCO) 7.5-325 MG per tablet Take 1 tablet by mouth Every 6 (Six) Hours As Needed for Moderate Pain . 20 tablet 0   • Latanoprostene Bunod (VYZULTA) 0.024 % solution Administer 1 drop to both eyes Every Night.     • lisinopril-hydrochlorothiazide (PRINZIDE,ZESTORETIC) 20-12.5 MG per tablet Take 1 tablet by mouth Daily.  0   • sertraline (ZOLOFT) 50 MG tablet Take 50 mg by mouth Daily.  0     No current facility-administered medications for this visit.        Allergies:  No Known Allergies    Review of Systems:   Review of Systems   Constitutional: Negative.    HENT: Negative.    Eyes: Negative.    Respiratory: Negative.    Cardiovascular: Negative.    Gastrointestinal: Negative.    Endocrine: Negative.    Genitourinary: Negative.    Musculoskeletal: Positive for arthralgias and joint swelling.   Skin: Negative.    Allergic/Immunologic: Negative.    Neurological: Negative.    Hematological: Negative.    Psychiatric/Behavioral: Negative.        Physical Exam:   Physical Exam   Constitutional: She is oriented to person, place, and time. She appears well-developed and well-nourished. No distress.   HENT:   Head: Normocephalic and atraumatic.   Eyes: EOM are normal. Right eye exhibits no discharge. Left eye exhibits no discharge.   Neck: Normal range of motion. Neck supple.   Cardiovascular: Normal rate and regular rhythm.    Murmur heard.  Pulmonary/Chest: Effort normal and breath sounds normal. No respiratory distress. She has no wheezes. She has no rales.   Abdominal: Soft. Bowel sounds are normal. She exhibits no distension. There is no tenderness.   Neurological: She is alert and oriented to person, place, and time.   Skin: Skin is warm and dry. No rash noted. She is not diaphoretic. No erythema.   Psychiatric: She has a normal  "mood and affect. Her behavior is normal. Judgment and thought content normal.     GENERAL: 73 y.o. female, alert and oriented X 3 in no acute distress.   Visit Vitals  /78   Pulse 68   Ht 160 cm (63\")   Wt 70.8 kg (156 lb)   BMI 27.63 kg/m²     Musculoskeletal: Left knee evaluation reveals mild effusion with moderate tenderness along the medial joint line.  She demonstrates full extension, full flexion, no significant crepitus with flexion-extension of her knee, no gross stability with varus valgus stressing.  Neurovascular grossly intact.    Radiology/Labs:   Previous radiographs and MRI left knee show large area of avascular necrosis medial femoral condyle with subchondral collapse.    Assessment & Plan:   73 y.o. female with left knee pain related to avascular necrosis with joint incongruity seen on the radiographs and MRI.  Again we reviewed her options at length.  She is a candidate for arthroscopy and drilling of the avascular necrosis but I doubt she would resume weightbearing soon and if so I think she would eventually require total knee arthroplasty.  She is adamant that she wishes to proceed with proposed left total knee replacement. She wishes to remain active and she wishes to return to her job.      ICD-10-CM ICD-9-CM   1. Avascular necrosis of medial femoral condyle, left (CMS/[facility]) M87.052 733.43   2. Primary osteoarthritis of left knee M17.12 715.16   3. Left knee pain, unspecified chronicity M25.562 719.46         Cc:   Miki Bay MD          Scribed for Ignacio Moreno MD by Jeannine Moreno RN.12:28 PM 07/11/2018                    "

## 2018-07-11 NOTE — H&P
History and Physical        Patient: Aydee Becker  YOB: 1945  Date of Encounter: 07/11/2018        HPI:   Aydee Becker, 73 y.o. female, returns today for pre-operative surgical update for left total knee arthroplasty scheduled 07/17/2018. She has completed medical and cardiac clearance.  Her symptoms remain the same.  She is severely limited due to her knee pain.  She wishes to remain active and work but she is not able to do so.  She continues to have significant pain with weightbearing activities.  She also has difficulty getting out of a squatted position such as a chair and she has had intra-articular steroid injection without improvement.  MRI obtained in the past shows large area of avascular necrosis medial femoral condyle.     Active Problem List:      Patient Active Problem List   Diagnosis   • Avascular necrosis of medial femoral condyle, left (CMS/HCC)   • Hypertension   • Glaucoma   • Primary osteoarthritis of left knee   • Murmur   • Abnormal ECG   • Preop cardiovascular exam         Past Medical History:  Medical History        Past Medical History:   Diagnosis Date   • Arthritis     • Hypertension     • Knee pain, left              Past Surgical History:  Surgical History         Past Surgical History:   Procedure Laterality Date   • CATARACT EXTRACTION Left       with stent placement for glaucoma            Family History:        Family History   Problem Relation Age of Onset   • Rheum arthritis Mother     • Hypertension Mother     • Rheum arthritis Father     • Gout Sister     • Gout Brother           Social History:  Social History   Social History            Social History   • Marital status:        Spouse name: N/A   • Number of children: N/A   • Years of education: N/A          Occupational History   • Not on file.           Social History Main Topics   • Smoking status: Never Smoker   • Smokeless tobacco: Never Used   • Alcohol use No   • Drug use: No   • Sexual  activity: Defer           Other Topics Concern   • Not on file          Social History Narrative   • No narrative on file         Patient's  BMI is above normal parameters. Recommendations include: educational material.        Medications:  Current Medications          Current Outpatient Prescriptions   Medication Sig Dispense Refill   • HYDROcodone-acetaminophen (NORCO) 7.5-325 MG per tablet Take 1 tablet by mouth Every 6 (Six) Hours As Needed for Moderate Pain . 20 tablet 0   • Latanoprostene Bunod (VYZULTA) 0.024 % solution Administer 1 drop to both eyes Every Night.       • lisinopril-hydrochlorothiazide (PRINZIDE,ZESTORETIC) 20-12.5 MG per tablet Take 1 tablet by mouth Daily.   0   • sertraline (ZOLOFT) 50 MG tablet Take 50 mg by mouth Daily.   0      No current facility-administered medications for this visit.             Allergies:  No Known Allergies     Review of Systems:   Review of Systems   Constitutional: Negative.    HENT: Negative.    Eyes: Negative.    Respiratory: Negative.    Cardiovascular: Negative.    Gastrointestinal: Negative.    Endocrine: Negative.    Genitourinary: Negative.    Musculoskeletal: Positive for arthralgias and joint swelling.   Skin: Negative.    Allergic/Immunologic: Negative.    Neurological: Negative.    Hematological: Negative.    Psychiatric/Behavioral: Negative.          Physical Exam:   Physical Exam   Constitutional: She is oriented to person, place, and time. She appears well-developed and well-nourished. No distress.   HENT:   Head: Normocephalic and atraumatic.   Eyes: EOM are normal. Right eye exhibits no discharge. Left eye exhibits no discharge.   Neck: Normal range of motion. Neck supple.   Cardiovascular: Normal rate and regular rhythm.    Murmur heard.  Pulmonary/Chest: Effort normal and breath sounds normal. No respiratory distress. She has no wheezes. She has no rales.   Abdominal: Soft. Bowel sounds are normal. She exhibits no distension. There is no  "tenderness.   Neurological: She is alert and oriented to person, place, and time.   Skin: Skin is warm and dry. No rash noted. She is not diaphoretic. No erythema.   Psychiatric: She has a normal mood and affect. Her behavior is normal. Judgment and thought content normal.      GENERAL: 73 y.o. female, alert and oriented X 3 in no acute distress.   Visit Vitals  /78   Pulse 68   Ht 160 cm (63\")   Wt 70.8 kg (156 lb)   BMI 27.63 kg/m²      Musculoskeletal: Left knee evaluation reveals mild effusion with moderate tenderness along the medial joint line.  She demonstrates full extension, full flexion, no significant crepitus with flexion-extension of her knee, no gross stability with varus valgus stressing.  Neurovascular grossly intact.     Radiology/Labs:   Previous radiographs and MRI left knee show large area of avascular necrosis medial femoral condyle with subchondral collapse.     Assessment & Plan:   73 y.o. female with left knee pain related to avascular necrosis with joint incongruity seen on the radiographs and MRI.  Again we reviewed her options at length.  She is a candidate for arthroscopy and drilling of the avascular necrosis but I doubt she would resume weightbearing soon and if so I think she would eventually require total knee arthroplasty.  She is adamant that she wishes to proceed with proposed left total knee replacement. She wishes to remain active and she wishes to return to her job.         ICD-10-CM ICD-9-CM   1. Avascular necrosis of medial femoral condyle, left (CMS/[facility]) M87.052 733.43   2. Primary osteoarthritis of left knee M17.12 715.16   3. Left knee pain, unspecified chronicity M25.562 719.46            Cc:   Miki Bay MD              Scribed for Ignacio Moreno MD by Jeannine Moreno RN.12:28 PM 07/11/2018               "

## 2018-07-16 ENCOUNTER — TELEPHONE (OUTPATIENT)
Dept: ORTHOPEDIC SURGERY | Facility: CLINIC | Age: 73
End: 2018-07-16

## 2018-07-16 NOTE — TELEPHONE ENCOUNTER
Patient is aware of surgery arrival time of 6:00 tomorrow morning for surgery.  Patient verbalized understanding.

## 2018-07-17 ENCOUNTER — APPOINTMENT (OUTPATIENT)
Dept: GENERAL RADIOLOGY | Facility: HOSPITAL | Age: 73
End: 2018-07-17

## 2018-07-17 ENCOUNTER — HOSPITAL ENCOUNTER (INPATIENT)
Facility: HOSPITAL | Age: 73
LOS: 2 days | Discharge: HOME HEALTH | End: 2018-07-19
Attending: ORTHOPAEDIC SURGERY | Admitting: ORTHOPAEDIC SURGERY

## 2018-07-17 ENCOUNTER — ANESTHESIA (OUTPATIENT)
Dept: PERIOP | Facility: HOSPITAL | Age: 73
End: 2018-07-17

## 2018-07-17 ENCOUNTER — ANESTHESIA EVENT (OUTPATIENT)
Dept: PERIOP | Facility: HOSPITAL | Age: 73
End: 2018-07-17

## 2018-07-17 DIAGNOSIS — M17.12 PRIMARY OSTEOARTHRITIS OF LEFT KNEE: ICD-10-CM

## 2018-07-17 DIAGNOSIS — M87.052 AVASCULAR NECROSIS OF MEDIAL FEMORAL CONDYLE, LEFT (HCC): Primary | ICD-10-CM

## 2018-07-17 LAB
ALBUMIN SERPL-MCNC: 3.8 G/DL (ref 3.4–4.8)
ALBUMIN/GLOB SERPL: 1.4 G/DL (ref 1.5–2.5)
ALP SERPL-CCNC: 79 U/L (ref 35–104)
ALT SERPL W P-5'-P-CCNC: 14 U/L (ref 10–36)
ANION GAP SERPL CALCULATED.3IONS-SCNC: 6.8 MMOL/L (ref 3.6–11.2)
AST SERPL-CCNC: 16 U/L (ref 10–30)
BACTERIA UR QL AUTO: NORMAL /HPF
BASOPHILS # BLD AUTO: 0.02 10*3/MM3 (ref 0–0.3)
BASOPHILS NFR BLD AUTO: 0.2 % (ref 0–2)
BILIRUB SERPL-MCNC: 0.5 MG/DL (ref 0.2–1.8)
BILIRUB UR QL STRIP: NEGATIVE
BUN BLD-MCNC: 9 MG/DL (ref 7–21)
BUN/CREAT SERPL: 13.2 (ref 7–25)
CALCIUM SPEC-SCNC: 9.1 MG/DL (ref 7.7–10)
CHLORIDE SERPL-SCNC: 101 MMOL/L (ref 99–112)
CLARITY UR: CLEAR
CO2 SERPL-SCNC: 26.2 MMOL/L (ref 24.3–31.9)
COLOR UR: YELLOW
CREAT BLD-MCNC: 0.68 MG/DL (ref 0.43–1.29)
DEPRECATED RDW RBC AUTO: 42 FL (ref 37–54)
EOSINOPHIL # BLD AUTO: 0.04 10*3/MM3 (ref 0–0.7)
EOSINOPHIL NFR BLD AUTO: 0.3 % (ref 0–7)
ERYTHROCYTE [DISTWIDTH] IN BLOOD BY AUTOMATED COUNT: 12.8 % (ref 11.5–14.5)
GFR SERPL CREATININE-BSD FRML MDRD: 85 ML/MIN/1.73
GLOBULIN UR ELPH-MCNC: 2.8 GM/DL
GLUCOSE BLD-MCNC: 142 MG/DL (ref 70–110)
GLUCOSE UR STRIP-MCNC: NEGATIVE MG/DL
HBA1C MFR BLD: 5.5 % (ref 4.5–5.7)
HCT VFR BLD AUTO: 36.6 % (ref 37–47)
HGB BLD-MCNC: 12.3 G/DL (ref 12–16)
HGB UR QL STRIP.AUTO: NEGATIVE
HYALINE CASTS UR QL AUTO: NORMAL /LPF
IMM GRANULOCYTES # BLD: 0.02 10*3/MM3 (ref 0–0.03)
IMM GRANULOCYTES NFR BLD: 0.2 % (ref 0–0.5)
KETONES UR QL STRIP: NEGATIVE
LEUKOCYTE ESTERASE UR QL STRIP.AUTO: ABNORMAL
LYMPHOCYTES # BLD AUTO: 0.56 10*3/MM3 (ref 1–3)
LYMPHOCYTES NFR BLD AUTO: 4.5 % (ref 16–46)
MAGNESIUM SERPL-MCNC: 1.7 MG/DL (ref 1.7–2.6)
MCH RBC QN AUTO: 30.6 PG (ref 27–33)
MCHC RBC AUTO-ENTMCNC: 33.6 G/DL (ref 33–37)
MCV RBC AUTO: 91 FL (ref 80–94)
MONOCYTES # BLD AUTO: 0.65 10*3/MM3 (ref 0.1–0.9)
MONOCYTES NFR BLD AUTO: 5.2 % (ref 0–12)
NEUTROPHILS # BLD AUTO: 11.11 10*3/MM3 (ref 1.4–6.5)
NEUTROPHILS NFR BLD AUTO: 89.6 % (ref 40–75)
NITRITE UR QL STRIP: NEGATIVE
OSMOLALITY SERPL CALC.SUM OF ELEC: 269.3 MOSM/KG (ref 273–305)
PH UR STRIP.AUTO: 6.5 [PH] (ref 5–8)
PHOSPHATE SERPL-MCNC: 3.3 MG/DL (ref 2.7–4.5)
PLATELET # BLD AUTO: 213 10*3/MM3 (ref 130–400)
PMV BLD AUTO: 9.7 FL (ref 6–10)
POTASSIUM BLD-SCNC: 3.5 MMOL/L (ref 3.5–5.3)
PROT SERPL-MCNC: 6.6 G/DL (ref 6–8)
PROT UR QL STRIP: NEGATIVE
RBC # BLD AUTO: 4.02 10*6/MM3 (ref 4.2–5.4)
RBC # UR: NORMAL /HPF
REF LAB TEST METHOD: NORMAL
SODIUM BLD-SCNC: 134 MMOL/L (ref 135–153)
SP GR UR STRIP: 1.01 (ref 1–1.03)
SQUAMOUS #/AREA URNS HPF: NORMAL /HPF
TSH SERPL DL<=0.05 MIU/L-ACNC: 2.87 MIU/ML (ref 0.55–4.78)
UROBILINOGEN UR QL STRIP: ABNORMAL
WBC NRBC COR # BLD: 12.4 10*3/MM3 (ref 4.5–12.5)
WBC UR QL AUTO: NORMAL /HPF

## 2018-07-17 PROCEDURE — 85025 COMPLETE CBC W/AUTO DIFF WBC: CPT | Performed by: PHYSICIAN ASSISTANT

## 2018-07-17 PROCEDURE — 94799 UNLISTED PULMONARY SVC/PX: CPT

## 2018-07-17 PROCEDURE — 25010000002 HYDROMORPHONE PER 4 MG: Performed by: ORTHOPAEDIC SURGERY

## 2018-07-17 PROCEDURE — G8979 MOBILITY GOAL STATUS: HCPCS

## 2018-07-17 PROCEDURE — 25010000003 CEFAZOLIN PER 500 MG: Performed by: ORTHOPAEDIC SURGERY

## 2018-07-17 PROCEDURE — 0SRD0J9 REPLACEMENT OF LEFT KNEE JOINT WITH SYNTHETIC SUBSTITUTE, CEMENTED, OPEN APPROACH: ICD-10-PCS | Performed by: ORTHOPAEDIC SURGERY

## 2018-07-17 PROCEDURE — 97163 PT EVAL HIGH COMPLEX 45 MIN: CPT

## 2018-07-17 PROCEDURE — 25010000002 FENTANYL CITRATE (PF) 100 MCG/2ML SOLUTION: Performed by: NURSE ANESTHETIST, CERTIFIED REGISTERED

## 2018-07-17 PROCEDURE — 80053 COMPREHEN METABOLIC PANEL: CPT | Performed by: PHYSICIAN ASSISTANT

## 2018-07-17 PROCEDURE — C1776 JOINT DEVICE (IMPLANTABLE): HCPCS | Performed by: ORTHOPAEDIC SURGERY

## 2018-07-17 PROCEDURE — 81001 URINALYSIS AUTO W/SCOPE: CPT | Performed by: PHYSICIAN ASSISTANT

## 2018-07-17 PROCEDURE — 99255 IP/OBS CONSLTJ NEW/EST HI 80: CPT | Performed by: INTERNAL MEDICINE

## 2018-07-17 PROCEDURE — 83036 HEMOGLOBIN GLYCOSYLATED A1C: CPT | Performed by: PHYSICIAN ASSISTANT

## 2018-07-17 PROCEDURE — 83735 ASSAY OF MAGNESIUM: CPT | Performed by: PHYSICIAN ASSISTANT

## 2018-07-17 PROCEDURE — 27447 TOTAL KNEE ARTHROPLASTY: CPT | Performed by: ORTHOPAEDIC SURGERY

## 2018-07-17 PROCEDURE — L1830 KO IMMOB CANVAS LONG PRE OTS: HCPCS | Performed by: ORTHOPAEDIC SURGERY

## 2018-07-17 PROCEDURE — 84443 ASSAY THYROID STIM HORMONE: CPT | Performed by: PHYSICIAN ASSISTANT

## 2018-07-17 PROCEDURE — 25010000002 PROPOFOL 1000 MG/ML EMULSION: Performed by: NURSE ANESTHETIST, CERTIFIED REGISTERED

## 2018-07-17 PROCEDURE — 97116 GAIT TRAINING THERAPY: CPT

## 2018-07-17 PROCEDURE — 93005 ELECTROCARDIOGRAM TRACING: CPT | Performed by: PHYSICIAN ASSISTANT

## 2018-07-17 PROCEDURE — C1713 ANCHOR/SCREW BN/BN,TIS/BN: HCPCS | Performed by: ORTHOPAEDIC SURGERY

## 2018-07-17 PROCEDURE — 73560 X-RAY EXAM OF KNEE 1 OR 2: CPT

## 2018-07-17 PROCEDURE — 73560 X-RAY EXAM OF KNEE 1 OR 2: CPT | Performed by: RADIOLOGY

## 2018-07-17 PROCEDURE — 25010000002 MIDAZOLAM PER 1 MG: Performed by: NURSE ANESTHETIST, CERTIFIED REGISTERED

## 2018-07-17 PROCEDURE — G8978 MOBILITY CURRENT STATUS: HCPCS

## 2018-07-17 PROCEDURE — 84100 ASSAY OF PHOSPHORUS: CPT | Performed by: PHYSICIAN ASSISTANT

## 2018-07-17 PROCEDURE — 97530 THERAPEUTIC ACTIVITIES: CPT

## 2018-07-17 DEVICE — SMARTSET HIGH PERFORMANCE MV MEDIUM VISCOSITY BONE CEMENT 40G
Type: IMPLANTABLE DEVICE | Site: KNEE | Status: FUNCTIONAL
Brand: SMARTSET

## 2018-07-17 DEVICE — ATTUNE KNEE SYSTEM TIBIAL BASE ROTATING PLATFORM SIZE 4 CEMENTED
Type: IMPLANTABLE DEVICE | Site: KNEE | Status: FUNCTIONAL
Brand: ATTUNE

## 2018-07-17 DEVICE — ATTUNE KNEE SYSTEM FEMORAL POSTERIOR STABILIZED SIZE 3 LEFT CEMENTED
Type: IMPLANTABLE DEVICE | Site: KNEE | Status: FUNCTIONAL
Brand: ATTUNE

## 2018-07-17 DEVICE — ATTUNE KNEE SYSTEM TIBIAL INSERT ROTATING PLATFORM POSTERIOR STABILIZED SIZE 3 5MM AOX
Type: IMPLANTABLE DEVICE | Site: KNEE | Status: FUNCTIONAL
Brand: ATTUNE

## 2018-07-17 DEVICE — TOTL KN ATTUNE DEPUY 9527038: Type: IMPLANTABLE DEVICE | Status: FUNCTIONAL

## 2018-07-17 RX ORDER — OXYCODONE HYDROCHLORIDE AND ACETAMINOPHEN 5; 325 MG/1; MG/1
1 TABLET ORAL ONCE AS NEEDED
Status: DISCONTINUED | OUTPATIENT
Start: 2018-07-17 | End: 2018-07-17 | Stop reason: HOSPADM

## 2018-07-17 RX ORDER — LISINOPRIL AND HYDROCHLOROTHIAZIDE 20; 12.5 MG/1; MG/1
1 TABLET ORAL DAILY
Status: CANCELLED | OUTPATIENT
Start: 2018-07-17

## 2018-07-17 RX ORDER — HYDROMORPHONE HYDROCHLORIDE 1 MG/ML
0.5 INJECTION, SOLUTION INTRAMUSCULAR; INTRAVENOUS; SUBCUTANEOUS
Status: DISCONTINUED | OUTPATIENT
Start: 2018-07-17 | End: 2018-07-19 | Stop reason: HOSPADM

## 2018-07-17 RX ORDER — MAGNESIUM SULFATE HEPTAHYDRATE 40 MG/ML
4 INJECTION, SOLUTION INTRAVENOUS ONCE
Status: COMPLETED | OUTPATIENT
Start: 2018-07-17 | End: 2018-07-18

## 2018-07-17 RX ORDER — MEPERIDINE HYDROCHLORIDE 50 MG/ML
12.5 INJECTION INTRAMUSCULAR; INTRAVENOUS; SUBCUTANEOUS
Status: DISCONTINUED | OUTPATIENT
Start: 2018-07-17 | End: 2018-07-17 | Stop reason: HOSPADM

## 2018-07-17 RX ORDER — SODIUM CHLORIDE, SODIUM LACTATE, POTASSIUM CHLORIDE, CALCIUM CHLORIDE 600; 310; 30; 20 MG/100ML; MG/100ML; MG/100ML; MG/100ML
100 INJECTION, SOLUTION INTRAVENOUS CONTINUOUS
Status: DISCONTINUED | OUTPATIENT
Start: 2018-07-17 | End: 2018-07-17

## 2018-07-17 RX ORDER — BUPIVACAINE HYDROCHLORIDE 7.5 MG/ML
INJECTION, SOLUTION EPIDURAL; RETROBULBAR AS NEEDED
Status: DISCONTINUED | OUTPATIENT
Start: 2018-07-17 | End: 2018-07-17 | Stop reason: SURG

## 2018-07-17 RX ORDER — LISINOPRIL 10 MG/1
20 TABLET ORAL DAILY
Status: DISCONTINUED | OUTPATIENT
Start: 2018-07-17 | End: 2018-07-19 | Stop reason: HOSPADM

## 2018-07-17 RX ORDER — FENTANYL CITRATE 50 UG/ML
INJECTION, SOLUTION INTRAMUSCULAR; INTRAVENOUS AS NEEDED
Status: DISCONTINUED | OUTPATIENT
Start: 2018-07-17 | End: 2018-07-17 | Stop reason: SURG

## 2018-07-17 RX ORDER — MAGNESIUM SULFATE HEPTAHYDRATE 40 MG/ML
2 INJECTION, SOLUTION INTRAVENOUS AS NEEDED
Status: DISCONTINUED | OUTPATIENT
Start: 2018-07-17 | End: 2018-07-19 | Stop reason: HOSPADM

## 2018-07-17 RX ORDER — NITROGLYCERIN 0.4 MG/1
0.4 TABLET SUBLINGUAL
Status: DISCONTINUED | OUTPATIENT
Start: 2018-07-17 | End: 2018-07-19 | Stop reason: HOSPADM

## 2018-07-17 RX ORDER — ONDANSETRON 2 MG/ML
4 INJECTION INTRAMUSCULAR; INTRAVENOUS ONCE AS NEEDED
Status: DISCONTINUED | OUTPATIENT
Start: 2018-07-17 | End: 2018-07-17 | Stop reason: HOSPADM

## 2018-07-17 RX ORDER — SODIUM CHLORIDE 0.9 % (FLUSH) 0.9 %
1-10 SYRINGE (ML) INJECTION AS NEEDED
Status: DISCONTINUED | OUTPATIENT
Start: 2018-07-17 | End: 2018-07-17 | Stop reason: HOSPADM

## 2018-07-17 RX ORDER — NALOXONE HCL 0.4 MG/ML
0.1 VIAL (ML) INJECTION
Status: DISCONTINUED | OUTPATIENT
Start: 2018-07-17 | End: 2018-07-19 | Stop reason: HOSPADM

## 2018-07-17 RX ORDER — MIDAZOLAM HYDROCHLORIDE 1 MG/ML
INJECTION INTRAMUSCULAR; INTRAVENOUS AS NEEDED
Status: DISCONTINUED | OUTPATIENT
Start: 2018-07-17 | End: 2018-07-17 | Stop reason: SURG

## 2018-07-17 RX ORDER — OXYCODONE HYDROCHLORIDE AND ACETAMINOPHEN 5; 325 MG/1; MG/1
1 TABLET ORAL EVERY 4 HOURS PRN
Status: DISCONTINUED | OUTPATIENT
Start: 2018-07-17 | End: 2018-07-19 | Stop reason: HOSPADM

## 2018-07-17 RX ORDER — FENTANYL CITRATE 50 UG/ML
50 INJECTION, SOLUTION INTRAMUSCULAR; INTRAVENOUS
Status: DISCONTINUED | OUTPATIENT
Start: 2018-07-17 | End: 2018-07-17 | Stop reason: HOSPADM

## 2018-07-17 RX ORDER — SODIUM CHLORIDE, SODIUM LACTATE, POTASSIUM CHLORIDE, CALCIUM CHLORIDE 600; 310; 30; 20 MG/100ML; MG/100ML; MG/100ML; MG/100ML
125 INJECTION, SOLUTION INTRAVENOUS CONTINUOUS
Status: DISCONTINUED | OUTPATIENT
Start: 2018-07-17 | End: 2018-07-17

## 2018-07-17 RX ORDER — HYDROCHLOROTHIAZIDE 12.5 MG/1
12.5 CAPSULE, GELATIN COATED ORAL DAILY
Status: DISCONTINUED | OUTPATIENT
Start: 2018-07-17 | End: 2018-07-18

## 2018-07-17 RX ORDER — DOCUSATE SODIUM 100 MG/1
100 CAPSULE, LIQUID FILLED ORAL 2 TIMES DAILY PRN
Status: DISCONTINUED | OUTPATIENT
Start: 2018-07-17 | End: 2018-07-18

## 2018-07-17 RX ORDER — MAGNESIUM SULFATE HEPTAHYDRATE 40 MG/ML
4 INJECTION, SOLUTION INTRAVENOUS AS NEEDED
Status: DISCONTINUED | OUTPATIENT
Start: 2018-07-17 | End: 2018-07-19 | Stop reason: HOSPADM

## 2018-07-17 RX ORDER — IPRATROPIUM BROMIDE AND ALBUTEROL SULFATE 2.5; .5 MG/3ML; MG/3ML
3 SOLUTION RESPIRATORY (INHALATION) ONCE AS NEEDED
Status: DISCONTINUED | OUTPATIENT
Start: 2018-07-17 | End: 2018-07-17 | Stop reason: HOSPADM

## 2018-07-17 RX ADMIN — OXYCODONE HYDROCHLORIDE AND ACETAMINOPHEN 1 TABLET: 5; 325 TABLET ORAL at 13:16

## 2018-07-17 RX ADMIN — MAGNESIUM SULFATE HEPTAHYDRATE 4 G: 40 INJECTION, SOLUTION INTRAVENOUS at 21:30

## 2018-07-17 RX ADMIN — SODIUM CHLORIDE 1000 MG: 9 INJECTION, SOLUTION INTRAVENOUS at 07:27

## 2018-07-17 RX ADMIN — FENTANYL CITRATE 25 MCG: 50 INJECTION INTRAMUSCULAR; INTRAVENOUS at 07:53

## 2018-07-17 RX ADMIN — SODIUM CHLORIDE, POTASSIUM CHLORIDE, SODIUM LACTATE AND CALCIUM CHLORIDE 125 ML/HR: 600; 310; 30; 20 INJECTION, SOLUTION INTRAVENOUS at 07:26

## 2018-07-17 RX ADMIN — SODIUM CHLORIDE, POTASSIUM CHLORIDE, SODIUM LACTATE AND CALCIUM CHLORIDE: 600; 310; 30; 20 INJECTION, SOLUTION INTRAVENOUS at 10:19

## 2018-07-17 RX ADMIN — PROPOFOL 75 MCG/KG/MIN: 10 INJECTION, EMULSION INTRAVENOUS at 07:58

## 2018-07-17 RX ADMIN — EPHEDRINE SULFATE 10 MG: 50 INJECTION INTRAMUSCULAR; INTRAVENOUS; SUBCUTANEOUS at 08:08

## 2018-07-17 RX ADMIN — EPHEDRINE SULFATE 10 MG: 50 INJECTION INTRAMUSCULAR; INTRAVENOUS; SUBCUTANEOUS at 08:10

## 2018-07-17 RX ADMIN — FENTANYL CITRATE 50 MCG: 50 INJECTION INTRAMUSCULAR; INTRAVENOUS at 11:10

## 2018-07-17 RX ADMIN — OXYCODONE HYDROCHLORIDE AND ACETAMINOPHEN 1 TABLET: 5; 325 TABLET ORAL at 23:41

## 2018-07-17 RX ADMIN — CEFAZOLIN SODIUM 2 G: 2 SOLUTION INTRAVENOUS at 15:47

## 2018-07-17 RX ADMIN — OXYCODONE HYDROCHLORIDE AND ACETAMINOPHEN 1 TABLET: 5; 325 TABLET ORAL at 18:59

## 2018-07-17 RX ADMIN — LISINOPRIL 20 MG: 10 TABLET ORAL at 15:20

## 2018-07-17 RX ADMIN — FENTANYL CITRATE 50 MCG: 50 INJECTION INTRAMUSCULAR; INTRAVENOUS at 11:35

## 2018-07-17 RX ADMIN — SODIUM CHLORIDE 1000 MG: 9 INJECTION, SOLUTION INTRAVENOUS at 10:19

## 2018-07-17 RX ADMIN — HYDROMORPHONE HYDROCHLORIDE 0.5 MG: 1 INJECTION, SOLUTION INTRAMUSCULAR; INTRAVENOUS; SUBCUTANEOUS at 12:48

## 2018-07-17 RX ADMIN — SODIUM CHLORIDE, POTASSIUM CHLORIDE, SODIUM LACTATE AND CALCIUM CHLORIDE 100 ML/HR: 600; 310; 30; 20 INJECTION, SOLUTION INTRAVENOUS at 14:19

## 2018-07-17 RX ADMIN — MIDAZOLAM HYDROCHLORIDE 2 MG: 1 INJECTION, SOLUTION INTRAMUSCULAR; INTRAVENOUS at 08:01

## 2018-07-17 RX ADMIN — MEPERIDINE HYDROCHLORIDE 12.5 MG: 50 INJECTION, SOLUTION INTRAMUSCULAR; INTRAVENOUS; SUBCUTANEOUS at 13:19

## 2018-07-17 RX ADMIN — EPHEDRINE SULFATE 10 MG: 50 INJECTION INTRAMUSCULAR; INTRAVENOUS; SUBCUTANEOUS at 08:27

## 2018-07-17 RX ADMIN — FENTANYL CITRATE 75 MCG: 50 INJECTION INTRAMUSCULAR; INTRAVENOUS at 10:40

## 2018-07-17 RX ADMIN — HYDROCHLOROTHIAZIDE 12.5 MG: 12.5 CAPSULE, GELATIN COATED ORAL at 15:20

## 2018-07-17 RX ADMIN — SERTRALINE 50 MG: 50 TABLET, FILM COATED ORAL at 15:20

## 2018-07-17 RX ADMIN — CEFAZOLIN SODIUM 2 G: 2 SOLUTION INTRAVENOUS at 07:35

## 2018-07-17 RX ADMIN — BUPIVACAINE HYDROCHLORIDE 1.6 ML: 7.5 INJECTION, SOLUTION EPIDURAL; RETROBULBAR at 07:53

## 2018-07-17 NOTE — ANESTHESIA PROCEDURE NOTES
Peripheral Block    Patient location during procedure: post-op  Start time: 7/17/2018 10:35 AM  Stop time: 7/17/2018 10:45 AM  Reason for block: at surgeon's request and post-op pain management  Performed by  CRNA: ALO HAYWOOD  Preanesthetic Checklist  Completed: patient identified, site marked, surgical consent, pre-op evaluation, timeout performed, IV checked, risks and benefits discussed and monitors and equipment checked  Prep:  Pt Position: supine  Sterile barriers:cap, gloves, mask and sterile barriers  Prep: ChloraPrep  Patient monitoring: blood pressure monitoring, continuous pulse oximetry and EKG  Procedure  Sedation:no  Performed under: MAC  Guidance:ultrasound guided  ULTRASOUND INTERPRETATION. Using ultrasound guidance a 20 G gauge needle was placed in close proximity to the nerve, at which point, under ultrasound guidance anesthetic was injected in the area of the nerve and spread of the anesthesia was seen on ultrasound in close proximity thereto.  There were no abnormalities seen on ultrasound; a digital image was taken; and the patient tolerated the procedure with no complications. Nerve locations: saphenous. Images:still images obtained    Block Type:adductor canal block  Injection Technique:single-shot  Needle Type:echogenic and short-bevel  Needle Gauge:20 G  Resistance on Injection: none  Catheter size: 20g.  Medications  Local Injected:ropivacaine 0.5% and bupivacaine 0.5% Local Amount Injected:30 (ml)mL  Post Assessment  Injection Assessment: negative aspiration for heme, incremental injection and no paresthesia on injection  Patient Tolerance:comfortable throughout block  Complications:no  Additional Notes  Procedure:               Analgesia was achieved with 75 mcg Fentanyl IV       The pt was placed in the Supine position.  The Insertion site was  prepped and Draped in sterile fashion.  A Halyard 20g echogenic needle was then  inserted approximately midline, mid-thigh and  advanced In-plane with Ultrasound guidance.  Normal Saline PSF was utilized for hydrodissection of tissue.  The Vastus medialis and Sartorius muscle where visualized and the needle tip was placed in the adductor canal,  lateral to the femoral artery.  LA injection spread was visualized, injection was incremental 1-5ml, injection pressure was normal or little, no intraneural injection, no vascular injection.  LA dose was injected thru the needle (see dose above). Thank you.

## 2018-07-17 NOTE — BRIEF OP NOTE
TOTAL KNEE ARTHROPLASTY  Progress Note    Aydee Becker  7/17/2018    Pre-op Diagnosis:   Primary osteoarthritis of left knee [M17.12]     Avascular necrosis medial femoral condyle left knee  Post-Op Diagnosis Codes:     Same    Procedure/CPT® Codes:      Procedure(s):  L;EFTTOTAL KNEE ARTHROPLASTY    Surgeon(s):  Ingacio Moreno MD    Anesthesia: General with Block/periarticular block    Staff:   Circulator: Baudilio Garrido RN  Scrub Person: Sujata Arias  Vendor Representative: Edgardo Manley  Assistant: Alec Lane    Estimated Blood Loss: 100 cc    Urine Voided: * No values recorded between 7/17/2018  7:32 AM and 7/17/2018 10:16 AM *    Specimens:                      Drains:  None    Findings:     Complications:       Ignacio Moreno MD     Date: 7/17/2018  Time: 10:16 AM

## 2018-07-17 NOTE — THERAPY EVALUATION
Acute Care - Physical Therapy Initial Evaluation/Treatment Note  MICKEY Garcia     Patient Name: Aydee Becker  : 1945  MRN: [id number]  Today's Date: 2018   Onset of Illness/Injury or Date of Surgery: 18 (admit date)  Date of Referral to PT: 18  Referring Physician: Tiffanie      Admit Date: 2018    Visit Dx:     ICD-10-CM ICD-9-CM   1. Primary osteoarthritis of left knee M17.12 715.16     Patient Active Problem List   Diagnosis   • Avascular necrosis of medial femoral condyle, left (CMS/HCC)   • Hypertension   • Glaucoma   • Primary osteoarthritis of left knee   • Murmur   • Abnormal ECG   • Preop cardiovascular exam     Past Medical History:   Diagnosis Date   • Arthritis    • Glaucoma    • Heart murmur    • Hypertension    • Knee pain, left      Past Surgical History:   Procedure Laterality Date   • CATARACT EXTRACTION Left     with stent placement for glaucoma        PT ASSESSMENT (last 12 hours)      Physical Therapy Evaluation     Row Name 18 1516          PT Evaluation Time/Intention    Subjective Information complains of;pain  -CT     Document Type evaluation;therapy note (daily note)  -CT     Mode of Treatment individual therapy;physical therapy  -CT     Patient Effort excellent  -CT     Symptoms Noted During/After Treatment increased pain  -CT     Comment Pt POD 0. Pt tolerated evaluation well with rest breaks provided as needed. Pt able to ambulate to chair with use of RW.  -CT     Row Name 18 1516          General Information    Patient Profile Reviewed? yes  -CT     Onset of Illness/Injury or Date of Surgery 18   admit date  -CT     Referring Physician Tiffanie  -CT     Patient Observations alert;cooperative;agree to therapy  -CT     Prior Level of Function independent:;all household mobility;community mobility  -CT     Existing Precautions/Restrictions fall;weight bearing   WBAT LLE  -CT     Risks Reviewed patient:;LOB;nausea/vomiting;dizziness;increased  discomfort;change in vital signs;increased drainage;lines disloged  -CT     Benefits Reviewed patient:;improve function;increase independence;increase strength;increase balance;decrease pain;decrease risk of DVT;improve skin integrity;increase knowledge  -CT     Barriers to Rehab physical barrier  -CT     Row Name 07/17/18 1516          Relationship/Environment    Primary Source of Support/Comfort child(brea)  -CT     Lives With child(brea), adult  -CT     Row Name 07/17/18 1516          Resource/Environmental Concerns    Current Living Arrangements home/apartment/condo  -CT     Row Name 07/17/18 1516          Cognitive Assessment/Intervention- PT/OT    Orientation Status (Cognition) oriented x 4  -CT     Follows Commands (Cognition) follows multi-step commands  -CT     Row Name 07/17/18 1516          Bed Mobility Assessment/Treatment    Bed Mobility Assessment/Treatment bed mobility (all) activities  -CT     [place] Level (Bed Mobility) moderate assist (50% patient effort)  -CT     Assistive Device (Bed Mobility) bed rails  -CT     Row Name 07/17/18 1516          Transfer Assessment/Treatment    Transfer Assessment/Treatment sit-stand transfer;stand-sit transfer  -CT     Maintains Weight-bearing Status (Transfers) able to maintain  -CT     Sit-Stand [place] (Transfers) moderate assist (50% patient effort)  -CT     Stand-Sit [place] (Transfers) moderate assist (50% patient effort)  -CT     Row Name 07/17/18 1516          Sit-Stand Transfer    Assistive Device (Sit-Stand Transfers) walker, front-wheeled  -CT     Row Name 07/17/18 1516          Stand-Sit Transfer    Assistive Device (Stand-Sit Transfers) walker, front-wheeled  -CT     Row Name 07/17/18 1516          Gait/Stairs Assessment/Training    [place] Level (Gait) moderate assist (50% patient effort);verbal cues;nonverbal cues (demo/gesture)  -CT     Assistive Device (Gait) walker, front-wheeled  -CT     Distance in Feet (Gait) 3   during  transfer over to chair  -CT     Pattern (Gait) step-to  -CT     Deviations/Abnormal Patterns (Gait) antalgic  -CT     Bilateral Gait Deviations forward flexed posture  -CT     Row Name 07/17/18 1516          General ROM    GENERAL ROM COMMENTS LLE deferred; RLE WFL  -CT     Row Name 07/17/18 1516          General Assessment (Manual Muscle Testing)    Comment, General Manual Muscle Testing (MMT) Assessment deferred  -CT     Row Name 07/17/18 1516          Pain Assessment    Additional Documentation Pain Scale: Numbers Pre/Post-Treatment (Group)  -CT     Row Name 07/17/18 1516          Pain Scale: Numbers Pre/Post-Treatment    Pain Scale: Numbers, Pretreatment 7/10  -CT     Pain Scale: Numbers, Post-Treatment 8/10  -CT     Pain Location - Side Left  -CT     Pain Location knee  -CT     Row Name             Wound 07/17/18 1015 Left knee incision    Wound - Properties Group Date first assessed: 07/17/18  -WM Time first assessed: 1015  -WM Side: Left  -WM Location: knee  -WM Type: incision  -WM    Row Name 07/17/18 1516          Plan of Care Review    Plan of Care Reviewed With patient  -CT     Row Name 07/17/18 1516          Physical Therapy Clinical Impression    Date of Referral to PT 07/17/18  -CT     PT Diagnosis (PT Clinical Impression) LLE TKR  -CT     Prognosis (PT Clinical Impression) good  -CT     Functional Level at Time of Evaluation (PT Clinical Impression) Mod A   -CT     Patient/Family Goals Statement (PT Clinical Impression) Pt goals are to return to PLOF  -CT     Criteria for Skilled Interventions Met (PT Clinical Impression) yes;treatment indicated  -CT     Pathology/Pathophysiology Noted (Describe Specifically for Each System) musculoskeletal;neuromuscular  -CT     Impairments Found (describe specific impairments) aerobic capacity/endurance;gait, locomotion, and balance  -CT     Functional Limitations in Following Categories (Describe Specific Limitations) self-care;home management  -CT     Rehab  Potential (PT Clinical Summary) good, to achieve stated therapy goals  -CT     Predicted Duration of Therapy (PT) length of stay  -CT     Care Plan Review (PT) evaluation/treatment results reviewed;care plan/treatment goals reviewed;risks/benefits reviewed;current/potential barriers reviewed;patient/other agree to care plan  -CT     Care Plan Review, Other Participant (PT Clinical Impression) daughter  -CT     Row Name 07/17/18 1516          Physical Therapy Goals    Bed Mobility Goal Selection (PT) bed mobility, PT goal 1  -CT     Transfer Goal Selection (PT) transfer, PT goal 1  -CT     Gait Training Goal Selection (PT) gait training, PT goal 1  -CT     Row Name 07/17/18 1516          Bed Mobility Goal 1 (PT)    Activity/Assistive Device (Bed Mobility Goal 1, PT) bed mobility activities, all  -CT     [place] Level/Cues Needed (Bed Mobility Goal 1, PT) minimum assist (75% or more patient effort)  -CT     Time Frame (Bed Mobility Goal 1, PT) by discharge  -CT     Row Name 07/17/18 1516          Transfer Goal 1 (PT)    Activity/Assistive Device (Transfer Goal 1, PT) sit-to-stand/stand-to-sit;bed-to-chair/chair-to-bed  -CT     [place] Level/Cues Needed (Transfer Goal 1, PT) minimum assist (75% or more patient effort)  -CT     Time Frame (Transfer Goal 1, PT) by discharge  -CT     Row Name 07/17/18 1516          Gait Training Goal 1 (PT)    Activity/Assistive Device (Gait Training Goal 1, PT) gait (walking locomotion);assistive device use  -CT     [place] Level (Gait Training Goal 1, PT) minimum assist (75% or more patient effort)  -CT     Time Frame (Gait Training Goal 1, PT) by discharge  -CT     Row Name 07/17/18 1516          Positioning and Restraints    Pre-Treatment Position in bed  -CT     Post Treatment Position chair  -CT     In Chair sitting;call light within reach;encouraged to call for assist;notified nsg  -CT       User Key  (r) = Recorded By, (t) = Taken By, (c) = Cosigned By    Initials  Name Provider Type     Baudilio Garrido, RN Registered Nurse    CT Diana Moses, PT Physical Therapist          Physical Therapy Education     Title: PT OT SLP Therapies (Done)     Topic: Physical Therapy (Done)     Point: Mobility training (Done)    Learning Progress Summary     Learner Status Readiness Method Response Comment Documented by    Patient Done Acceptance E,TB NR,VU  CT 07/17/18 1526          Point: Home exercise program (Done)    Learning Progress Summary     Learner Status Readiness Method Response Comment Documented by    Patient Done Acceptance E,TB NR,VU  CT 07/17/18 1526          Point: Body mechanics (Done)    Learning Progress Summary     Learner Status Readiness Method Response Comment Documented by    Patient Done Acceptance E,TB NR,VU  CT 07/17/18 1526          Point: Precautions (Done)    Learning Progress Summary     Learner Status Readiness Method Response Comment Documented by    Patient Done Acceptance E,TB NR,VU  CT 07/17/18 1526                      User Key     Initials Effective Dates Name Provider Type Discipline    CT 04/03/18 -  Diana Moses, PT Physical Therapist PT                PT Recommendation and Plan  Anticipated Discharge Disposition (PT): home with home health, home with 24/7 care  Planned Therapy Interventions (PT Eval): balance training, bed mobility training, gait training, home exercise program, manual therapy techniques, motor coordination training, neuromuscular re-education, patient/family education, postural re-education, stair training, strengthening, transfer training  Therapy Frequency (PT Clinical Impression): 2 times/day  Outcome Summary/Treatment Plan (PT)  Anticipated Equipment Needs at Discharge (PT): front wheeled walker  Anticipated Discharge Disposition (PT): home with home health, home with 24/7 care  Plan of Care Reviewed With: patient          Outcome Measures     Row Name 07/17/18 1500             How much help from another person do you  currently need...    Turning from your back to your side while in flat bed without using bedrails? 3  -CT      Moving from lying on back to sitting on the side of a flat bed without bedrails? 1  -CT      Moving to and from a bed to a chair (including a wheelchair)? 1  -CT      Standing up from a chair using your arms (e.g., wheelchair, bedside chair)? 1  -CT      Climbing 3-5 steps with a railing? 1  -CT      To walk in hospital room? 2  -CT      AM-PAC 6 Clicks Score 9  -CT         Functional Assessment    Outcome Measure Options AM-PAC 6 Clicks Basic Mobility (PT)  -CT        User Key  (r) = Recorded By, (t) = Taken By, (c) = Cosigned By    Initials Name Provider Type    CT Diana Moses PT Physical Therapist           Time Calculation:         PT Charges     Row Name 07/17/18 1527             Time Calculation    PT Received On 07/17/18  -CT      PT - Next Appointment 07/18/18  -CT      PT Goal Re-Cert Due Date 07/31/18  -CT         Time Calculation- PT    Total Timed Code Minutes- PT 57 minute(s)  -CT         Timed Charges    94869 - Gait Training Minutes  10  -CT      96215 - PT Therapeutic Activity Minutes 20  -CT        User Key  (r) = Recorded By, (t) = Taken By, (c) = Cosigned By    Initials Name Provider Type    CT Diana Moses PT Physical Therapist          Therapy Charges for Today     Code Description Service Date Service Provider Modifiers Qty    07101058788 HC PT MOBILITY CURRENT 7/17/2018 Diana Moses, PT GP, CL 1    40717400665 HC PT MOBILITY PROJECTED 7/17/2018 Diana Moses PT GP, CK 1    04346446236 HC PT EVAL HIGH COMPLEXITY 2 7/17/2018 Diana Moses PT GP 1    16460032583 HC GAIT TRAINING EA 15 MIN 7/17/2018 Diana Moses, PT GP 1    57152063674 HC PT THERAPEUTIC ACT EA 15 MIN 7/17/2018 Diana Moses PT GP 1    94346990266 HC PT THER SUPP EA 15 MIN 7/17/2018 Diana Moses PT GP 4          PT G-Codes  Outcome Measure Options: AM-PAC 6 Clicks Basic Mobility (PT)  Score:  9  Functional Limitation: Mobility: Walking and moving around  Mobility: Walking and Moving Around Current Status (): At least 60 percent but less than 80 percent impaired, limited or restricted  Mobility: Walking and Moving Around Goal Status (): At least 40 percent but less than 60 percent impaired, limited or restricted      Diana Moses, PT  7/17/2018

## 2018-07-17 NOTE — ANESTHESIA POSTPROCEDURE EVALUATION
Patient: Aydee Becker    Procedure Summary     Date:  07/17/18 Room / Location:  [facility] OR 03 /  [facility] OR    Anesthesia Start:  0735 Anesthesia Stop:  1030    Procedure:  TOTAL KNEE ARTHROPLASTY (Left Knee) Diagnosis:       Primary osteoarthritis of left knee      (Primary osteoarthritis of left knee [M17.12])    Surgeon:  Ignacio Moreno MD Provider:  Ciro Heard MD    Anesthesia Type:  spinal ASA Status:  2          Anesthesia Type: spinal  Last vitals  BP   141/76 (07/17/18 1035)   Temp   98 °F (36.7 °C) (07/17/18 1030)   Pulse   84 (07/17/18 1035)   Resp   16 (07/17/18 1035)     SpO2   99 % (07/17/18 1035)     Post Anesthesia Care and Evaluation    Patient location during evaluation: PHASE II  Patient participation: complete - patient participated  Level of consciousness: awake and alert  Pain score: 1  Pain management: adequate  Airway patency: patent  Anesthetic complications: No anesthetic complications  PONV Status: controlled  Cardiovascular status: acceptable  Respiratory status: acceptable  Hydration status: acceptable

## 2018-07-17 NOTE — PLAN OF CARE
Problem: Patient Care Overview  Goal: Plan of Care Review  Outcome: Ongoing (interventions implemented as appropriate)   07/17/18 1516   Coping/Psychosocial   Plan of Care Reviewed With patient     Goal: Interprofessional Rounds/Family Conf  Outcome: Ongoing (interventions implemented as appropriate)   07/17/18 1824   Interdisciplinary Rounds/Family Conf   Participants nursing       Problem: Mobility, Physical Impaired (Adult)  Goal: Identify Related Risk Factors and Signs and Symptoms  Outcome: Ongoing (interventions implemented as appropriate)   07/17/18 1824   Mobility, Physical Impaired (Adult)   Related Risk Factors (Physical Mobility, Impaired) pain/discomfort;surgery/procedure     Goal: Enhanced Mobility Skills  Outcome: Ongoing (interventions implemented as appropriate)    Goal: Enhanced Functional Ability  Outcome: Ongoing (interventions implemented as appropriate)      Problem: Fall Risk (Adult)  Goal: Identify Related Risk Factors and Signs and Symptoms  Outcome: Ongoing (interventions implemented as appropriate)   07/17/18 1824   Fall Risk (Adult)   Related Risk Factors (Fall Risk) gait/mobility problems     Goal: Absence of Fall  Outcome: Ongoing (interventions implemented as appropriate)   07/17/18 1824   Fall Risk (Adult)   Absence of Fall making progress toward outcome

## 2018-07-17 NOTE — CONSULTS
[facility] HOSPITALIST CONSULT NOTE     Inpatient Hospitalist Consult  Consult performed by: COURTNEY GARCIA  Consult ordered by: KINGSTON PALMER          Patient Identification:  Name:  Aydee Becker  Age:  73 y.o.  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 Hospitalist services were consulted for medical management. Patient's past medical history is significant for essential hypertension, grade I diastolic dysfunction, mild pulmonary hypertension, glaucoma, and depression.     Patient tolerated procedure well earlier today. She has already been up to bedside chair and worked with physical therapy today. She reports moderate pain in her left knee, reports current pain medication regimen does slightly improve her discomfort. Patient states that prior to surgery she has been in good health. She was diagnosed with a murmur and was seen by cardiology prior to surgery for cardiac clearance. ECHO revealed mild Pul HTN and grade I diastolic dysfunction. EKG had revealed 1st degree AVB. She was cleared for surgery by Dr. Lao with cardiology. She has had no further cardiac issues. Patient states that her blood pressure is typically controlled with lisinopril/HCTZ combo pill. She is not diabetic. She has no complaints other than left knee pain at this time. She denies any chest pain or shortness of breath. No cough. She denies any abdominal pain, nausea, vomiting, or diarrhea. She denies any urinary symptoms. No fevers or chills. Denies any paresthesias.     Present during exam: Patient's multiple family  members  ---------------------------------------------------------------------------------------------------------------------  Review of Systems   Constitutional: Negative for activity change, appetite change, chills, diaphoresis, fatigue and fever.   HENT: Negative for congestion, postnasal drip, rhinorrhea, sinus pain, sore throat and trouble swallowing.    Eyes: Negative for discharge and visual disturbance.   Respiratory: Negative for cough, chest tightness, shortness of breath and wheezing.    Cardiovascular: Negative for chest pain, palpitations and leg swelling.   Gastrointestinal: Negative for abdominal pain, constipation, diarrhea, nausea and vomiting.   Endocrine: Negative for cold intolerance and heat intolerance.   Genitourinary: Negative for decreased urine volume, dysuria, frequency and urgency.   Musculoskeletal: Negative for arthralgias, gait problem and myalgias.        Left knee pain post operatively      Skin: Negative for color change, rash and wound.   Allergic/Immunologic: Negative for environmental allergies and immunocompromised state.   Neurological: Negative for dizziness, syncope, weakness, light-headedness and headaches.   Hematological: Negative for adenopathy. Does not bruise/bleed easily.   Psychiatric/Behavioral: Negative for confusion and decreased concentration. The patient is not nervous/anxious.       ---------------------------------------------------------------------------------------------------------------------   Past History:  Past Medical History:   Diagnosis Date   • Arthritis    • Depression    • Diastolic dysfunction    • Glaucoma    • Heart murmur    • Hypertension    • Knee pain, left    • Mild pulmonary hypertension      Past Surgical History:   Procedure Laterality Date   • CATARACT EXTRACTION Left     with stent placement for glaucoma     Family History   Problem Relation Age of Onset   • Rheum arthritis Mother    • Hypertension Mother    • Rheum arthritis Father     • Gout Sister    • Gout Brother      Social History     Social History   • Marital status:      Social History Main Topics   • Smoking status: Never Smoker   • Smokeless tobacco: Never Used   • Alcohol use No   • Drug use: No   • Sexual activity: Defer     Other Topics Concern   • Not on file     ---------------------------------------------------------------------------------------------------------------------   Allergies:  Patient has no known allergies.  ---------------------------------------------------------------------------------------------------------------------   Prior to Admission Medications     Prescriptions Last Dose Informant Patient Reported? Taking?    HYDROcodone-acetaminophen (NORCO) 7.5-325 MG per tablet 7/16/2018 Self No Yes    Take 1 tablet by mouth Every 6 (Six) Hours As Needed for Moderate Pain .    Patient taking differently:  Take 0.5 tablets by mouth 2 (Two) Times a Day As Needed for Moderate Pain .    lisinopril-hydrochlorothiazide (PRINZIDE,ZESTORETIC) 20-12.5 MG per tablet 7/16/2018 Self Yes Yes    Take 1 tablet by mouth Daily.    sertraline (ZOLOFT) 50 MG tablet 7/16/2018 Self Yes Yes    Take 50 mg by mouth Daily.    Latanoprostene Bunod (VYZULTA) 0.024 % solution 7/15/2018 Self Yes No    Administer 1 drop to both eyes Every Night.        ---------------------------------------------------------------------------------------------------------------------     [facility]:    [START ON 7/18/2018] apixaban 2.5 mg Oral Q12H   bacitracin + polymyxin in normal saline 3000 mL IRRIGATION  Irrigation Once   ceFAZolin 2 g Intravenous Q8H   lisinopril 20 mg Oral Daily   And      Hydrochlorothiazide Oral 12.5 mg Oral Daily   Latanoprostene Bunod 1 drop Both Eyes Nightly   sertraline 50 mg Oral Daily     ---------------------------------------------------------------------------------------------------------------------   Vital Signs:  Temp:  [97.4 °F (36.3 °C)-98.5 °F  (36.9 °C)] 97.5 °F (36.4 °C)  Heart Rate:  [73-94] 85  Resp:  [12-20] 18  BP: (136-169)/(71-94) 165/73  No data found.    SpO2 Percentage    07/17/18 1508 07/17/18 1530 07/17/18 1600   SpO2: 94% 94% 93%     SpO2:  [93 %-100 %] 93 %  on  Flow (L/min):  [2-4] 2;   Device (Oxygen Therapy): room air    Body mass index is 27.63 kg/m².  Wt Readings from Last 3 Encounters:   07/17/18 70.8 kg (156 lb)   07/11/18 70.8 kg (156 lb)   07/11/18 70.8 kg (156 lb)        Intake/Output Summary (Last 24 hours) at 07/17/18 1710  Last data filed at 07/17/18 1019   Gross per 24 hour   Intake             1500 ml   Output                0 ml   Net             1500 ml     Diet Regular  ---------------------------------------------------------------------------------------------------------------------   Physical exam:  Constitutional:  Well-developed and well-nourished.  No respiratory distress. Pleasant.     HENT:  Head: Normocephalic and atraumatic.  Mouth:  Moist mucous membranes.    Eyes:  Conjunctivae and EOM are normal.  Pupils are equal, round, and reactive to light.  No scleral icterus.    Neck:  Neck supple.  No JVD present.    Cardiovascular:  Regular rate and rhythm.  S1 + S2 + systolic murmur  Pulmonary/Chest:  No respiratory distress, no wheezes, no crackles, with normal breath sounds and good air movement.  Abdominal:  Soft.  Bowel sounds are normal.  No distension and no tenderness.   Musculoskeletal:  LLE in knee immobilizer brace, surgical wound not visualized today. RLE unremarkable. NVI bilaterally.     Neurological:  Alert and oriented to person, place, and time.  No cranial nerve deficit.  No tongue deviation.  No facial droop.  No slurred speech.   Skin:  Skin is warm and dry. No rash noted.  No pallor.   Peripheral vascular: No edema. Capillary refill < 3 seconds.   Psychiatric:  Normal mood and affect.  Behavior is normal.  Judgment and thought content normal.   Genitourinary: No monae catheter in place, making  urine.   ---------------------------------------------------------------------------------------------------------------------   EKG:       Telemetry:  Telemetry monitoring not in place at this time, will review once monitoring available    I have personally reviewed the EKG/Telemetry strips.   ---------------------------------------------------------------------------------------------------------------------               Results from last 7 days  Lab Units 07/17/18 1618 07/11/18  0839   WBC 10*3/mm3 12.40 5.93   HEMOGLOBIN g/dL 12.3 13.8   HEMATOCRIT % 36.6* 41.7   MCV fL 91.0 91.6   MCHC g/dL 33.6 33.1   PLATELETS 10*3/mm3 213 261       Results from last 7 days  Lab Units 07/17/18 1618 07/11/18  0839   SODIUM mmol/L 134* 136   POTASSIUM mmol/L 3.5 3.7   MAGNESIUM mg/dL 1.7  --    CHLORIDE mmol/L 101 101   CO2 mmol/L 26.2 27.2   BUN mg/dL 9 15   CREATININE mg/dL 0.68 0.77   EGFR IF NONAFRICN AM mL/min/1.73 85 73   CALCIUM mg/dL 9.1 9.7   GLUCOSE mg/dL 142* 79   ALBUMIN g/dL 3.80  --    BILIRUBIN mg/dL 0.5  --    ALK PHOS U/L 79  --    AST (SGOT) U/L 16  --    ALT (SGPT) U/L 14  --    Estimated Creatinine Clearance: 58.2 mL/min (by C-G formula based on SCr of 0.68 mg/dL).  No results found for: AMMONIA    No results found for: HGBA1C, POCGLU  No results found for: HGBA1C  Lab Results   Component Value Date    TSH 2.871 07/17/2018     I have personally reviewed the above laboratory results.   ---------------------------------------------------------------------------------------------------------------------  Imaging Results (last 7 days)     Procedure Component Value Units Date/Time    XR Knee 1 or 2 View Left [[id number]] Collected:  07/17/18 1119     Updated:  07/17/18 1121    Narrative:       FINDINGS:   Patient is status post total left knee arthroplasty. Anatomic alignment.  No hardware complication.        Impression:       As above.     This report was finalized on 7/17/2018 11:19 AM by Dr. Malcolm ROMERO  MD Aletha.      I have personally reviewed the above radiology results.     Transthoracic Echocardiogram 6/26/2018    ----------------------------------------------------------------------------------------------------------------------    Assessment/Plan     -Left knee osteoarthritis with avascular necrosis status post left total knee arthroplasty POD # 0  -Essential hypertension, controlled   -Grade I diastolic dysfunction   -Mild pulmonary hypertension   -Mild hyperglycemia without history of diabetes mellitus   -History of 1st degree AVB, NSR currently   -Glaucoma   -Depression     Continue post operative care and pain management per primary (orthopedic surgery). PT per ortho recommendations. STAT labs and post operative EKG ordered and reviewed.  Encourage incentive spirometry.    As pateint has grade I diastolic dysfunction and SBP is stable at 155, will stop IV fluids. Patient tolerating diet. Continue to closely monitor for signs and symptoms of volume overload. Continue patient's home blood pressure medications with appropriate holding parameters, avoid hypotension with administration of narcotic analgesia and BP medication. Place patient on telemetry monitoring.     Mild hyperglycemia noted without history of DM. Will obtain HgbA1C to evaluate glycemic control, hold off on SSI/accuchecks for now. Will also obtain baseline TSH.     Continue to closely monitor electrolytes and replace per protocol as necessary. Repeat labs in AM and continue to closely monitor the patient.       DVT Prophylaxis: Eliquis per orthopedic surgery     Disposition: Per primary, will need home health services upon discharge.  on the case.     CODE STATUS: FULL CODE    Thank you for the consult, Hospitalist Services will continue to follow.     CURTIS Romano  07/17/18  5:10 PM  ---------------------------------------------------------------------------------------------------------------------   I have seen  and examined the patient. I agree with the assessment and plan of Renea ACEVEDO. I have amended the above note to reflect my findings in history, physical examination, decision making and management plan.

## 2018-07-17 NOTE — ANESTHESIA POSTPROCEDURE EVALUATION
Patient: Aydee Becker    Procedure Summary     Date:  07/17/18 Room / Location:  [facility] OR 03 /  [facility] OR    Anesthesia Start:  0735 Anesthesia Stop:  1030    Procedure:  TOTAL KNEE ARTHROPLASTY (Left Knee) Diagnosis:       Primary osteoarthritis of left knee      (Primary osteoarthritis of left knee [M17.12])    Surgeon:  Ignacio Moreno MD Provider:  Ciro Heard MD    Anesthesia Type:  spinal ASA Status:  2          Anesthesia Type: spinal  Last vitals  BP   157/81 (07/17/18 1346)   Temp   98.4 °F (36.9 °C) (07/17/18 1346)   Pulse   93 (07/17/18 1346)   Resp   16 (07/17/18 1346)     SpO2   97 % (07/17/18 1346)     Post Anesthesia Care and Evaluation    Patient location during evaluation: PACU  Patient participation: complete - patient participated  Level of consciousness: awake and alert  Pain score: 1  Pain management: adequate  Airway patency: patent  Anesthetic complications: No anesthetic complications  PONV Status: controlled  Cardiovascular status: acceptable  Respiratory status: acceptable  Hydration status: acceptable

## 2018-07-17 NOTE — OP NOTE
DATE OF SURGERY: 07/17/18    PREOPERATIVE DIAGNOSIS: 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  The left leg was placed in tourniquet grams of Ancef given immediately preoperatively and 1 g of TXA.  Left leg was sterilely prepped and draped in usual manner.  Midline incision was made medial parapatellar arthrotomy carried out the patella everted the knee flexed both menisci excised.  Attached articular cartilage distal portion of the medial femoral condyle was removed.  Underlying bone was sclerotic.  With intramedullary guide position the distal femoral cut was made at 11 mm.  Intercondylar notch was osteotomized and a trial femoral component position found to be appropriate and removed.  External tibial alignment device was positioned and the proximal tibia cut made 6 mm off the medial side.  Trial spacers were positioned and the knee was balanced releasing medial side with Lacy elevator.  The extremity was then exsanguinated and the tourniquet inflated to 250 mmHg.  Proximal tibia was prepared for keel and broach with punch.  With trial tibial tray in place the femoral component was repositioned and 5 mm insert was found to be appropriate thickness.  Trial components were removed.  The medial femoral condyle small cystic area was curetted.  Bony surfaces were aggressively irrigated and sponged dry.  Cement was repaired both tibial and femoral components were precoated and then impacted into place removing excess cement and placing spacer holding knee in full extension.  Knee was taken  through range of motion demonstrating full flexion full extension good medial lateral stability.  Again the joint was copiously irrigated removing excess cement posteriorly and then placing trial spacer which was appropriate.  It was removed and the actual implant was positioned.  Again the knee was irrigated with the the knee then closed in layers using running strata fixed for quadriceps tendon and patellar ligament and layered closure with 30 strata fixed for final closure.  Dermabond skin dressing was applied knee immobilizer was applied she was taken to recovery room in stable condition.  Implant components a 10 posterior stabilized femur size 3 rotating platform tibial base size 4 rotating platform posterior stabilized tibial insert size 3 x 5 mm.       Dictator Signature:___________________________  Ignacio Moreno M.D.     SOLOMON Muñoz M.D.

## 2018-07-17 NOTE — ANESTHESIA PREPROCEDURE EVALUATION
Anesthesia Evaluation     Patient summary reviewed and Nursing notes reviewed   no history of anesthetic complications:  NPO Solid Status: > 8 hours  NPO Liquid Status: > 8 hours           Airway   Mallampati: II  TM distance: >3 FB  Neck ROM: full  no difficulty expected  Dental - normal exam   (+) edentulous and lower dentures    Pulmonary - negative pulmonary ROS and normal exam   (-) asthma, not a smoker  Cardiovascular - normal exam  Exercise tolerance: good (4-7 METS)    NYHA Classification: II    (+) hypertension well controlled, valvular problems/murmurs murmur,   (-) past MI, dysrhythmias, angina, CHF      Neuro/Psych- negative ROS  (-) seizures, CVA  GI/Hepatic/Renal/Endo - negative ROS   (-) GERD, liver disease, no renal disease, diabetes, hypothyroidism    Musculoskeletal     Abdominal  - normal exam    Bowel sounds: normal.   Substance History - negative use  (-) alcohol use, drug use     OB/GYN negative ob/gyn ROS         Other   (+) arthritis                     Anesthesia Plan    ASA 2     spinal   (Left adductor canal block in RR  )  intravenous induction   Anesthetic plan and risks discussed with patient and child.  Use of blood products discussed with patient and child  Consented to blood products.

## 2018-07-17 NOTE — ANESTHESIA PROCEDURE NOTES
Spinal Block    Patient location during procedure: OB  Start Time: 7/17/2018 7:40 AM  Stop Time: 7/17/2018 7:53 AM  Indication:at surgeon's request, post-op pain management and procedure for pain  Performed By  Anesthesiologist: ERIC HIDALGO  CRNA: ALO HAYWOOD  Preanesthetic Checklist  Completed: patient identified, site marked, surgical consent, pre-op evaluation, timeout performed, IV checked, risks and benefits discussed and monitors and equipment checked  Spinal Block Prep:  Patient Position:sitting  Sterile Tech:cap, gloves, mask and sterile barriers  Prep:Betadine  Patient Monitoring:blood pressure monitoring, continuous pulse oximetry and EKG  Spinal Block Procedure  Approach:midline  Guidance:landmark technique  Location:L2-L3  Needle Type:Pencan  Needle Gauge:24 G  Placement of Spinal needle event:cerebrospinal fluid aspirated  Paresthesia: no  Fluid Appearance:clear  Post Assessment  Patient Tolerance:patient tolerated the procedure well with no apparent complications  Complications no  Additional Notes  Pt had difficulty positioning self, first attempt unable to place past spinous process, pr repositioned, assisted by RN, moved to l2-l3, sab placed without difficulty. Pt buzz well

## 2018-07-17 NOTE — H&P (VIEW-ONLY)
History and Physical        Patient: Aydee Becker  YOB: 1945  Date of Encounter: 07/11/2018        HPI:   Aydee Becker, 73 y.o. female, returns today for pre-operative surgical update for left total knee arthroplasty scheduled 07/17/2018. She has completed medical and cardiac clearance.  Her symptoms remain the same.  She is severely limited due to her knee pain.  She wishes to remain active and work but she is not able to do so.  She continues to have significant pain with weightbearing activities.  She also has difficulty getting out of a squatted position such as a chair and she has had intra-articular steroid injection without improvement.  MRI obtained in the past shows large area of avascular necrosis medial femoral condyle.     Active Problem List:      Patient Active Problem List   Diagnosis   • Avascular necrosis of medial femoral condyle, left (CMS/HCC)   • Hypertension   • Glaucoma   • Primary osteoarthritis of left knee   • Murmur   • Abnormal ECG   • Preop cardiovascular exam         Past Medical History:  Medical History        Past Medical History:   Diagnosis Date   • Arthritis     • Hypertension     • Knee pain, left              Past Surgical History:  Surgical History         Past Surgical History:   Procedure Laterality Date   • CATARACT EXTRACTION Left       with stent placement for glaucoma            Family History:        Family History   Problem Relation Age of Onset   • Rheum arthritis Mother     • Hypertension Mother     • Rheum arthritis Father     • Gout Sister     • Gout Brother           Social History:  Social History   Social History            Social History   • Marital status:        Spouse name: N/A   • Number of children: N/A   • Years of education: N/A          Occupational History   • Not on file.           Social History Main Topics   • Smoking status: Never Smoker   • Smokeless tobacco: Never Used   • Alcohol use No   • Drug use: No   • Sexual  activity: Defer           Other Topics Concern   • Not on file          Social History Narrative   • No narrative on file         Patient's  BMI is above normal parameters. Recommendations include: educational material.        Medications:  Current Medications          Current Outpatient Prescriptions   Medication Sig Dispense Refill   • HYDROcodone-acetaminophen (NORCO) 7.5-325 MG per tablet Take 1 tablet by mouth Every 6 (Six) Hours As Needed for Moderate Pain . 20 tablet 0   • Latanoprostene Bunod (VYZULTA) 0.024 % solution Administer 1 drop to both eyes Every Night.       • lisinopril-hydrochlorothiazide (PRINZIDE,ZESTORETIC) 20-12.5 MG per tablet Take 1 tablet by mouth Daily.   0   • sertraline (ZOLOFT) 50 MG tablet Take 50 mg by mouth Daily.   0      No current facility-administered medications for this visit.             Allergies:  No Known Allergies     Review of Systems:   Review of Systems   Constitutional: Negative.    HENT: Negative.    Eyes: Negative.    Respiratory: Negative.    Cardiovascular: Negative.    Gastrointestinal: Negative.    Endocrine: Negative.    Genitourinary: Negative.    Musculoskeletal: Positive for arthralgias and joint swelling.   Skin: Negative.    Allergic/Immunologic: Negative.    Neurological: Negative.    Hematological: Negative.    Psychiatric/Behavioral: Negative.          Physical Exam:   Physical Exam   Constitutional: She is oriented to person, place, and time. She appears well-developed and well-nourished. No distress.   HENT:   Head: Normocephalic and atraumatic.   Eyes: EOM are normal. Right eye exhibits no discharge. Left eye exhibits no discharge.   Neck: Normal range of motion. Neck supple.   Cardiovascular: Normal rate and regular rhythm.    Murmur heard.  Pulmonary/Chest: Effort normal and breath sounds normal. No respiratory distress. She has no wheezes. She has no rales.   Abdominal: Soft. Bowel sounds are normal. She exhibits no distension. There is no  "tenderness.   Neurological: She is alert and oriented to person, place, and time.   Skin: Skin is warm and dry. No rash noted. She is not diaphoretic. No erythema.   Psychiatric: She has a normal mood and affect. Her behavior is normal. Judgment and thought content normal.      GENERAL: 73 y.o. female, alert and oriented X 3 in no acute distress.   Visit Vitals  /78   Pulse 68   Ht 160 cm (63\")   Wt 70.8 kg (156 lb)   BMI 27.63 kg/m²      Musculoskeletal: Left knee evaluation reveals mild effusion with moderate tenderness along the medial joint line.  She demonstrates full extension, full flexion, no significant crepitus with flexion-extension of her knee, no gross stability with varus valgus stressing.  Neurovascular grossly intact.     Radiology/Labs:   Previous radiographs and MRI left knee show large area of avascular necrosis medial femoral condyle with subchondral collapse.     Assessment & Plan:   73 y.o. female with left knee pain related to avascular necrosis with joint incongruity seen on the radiographs and MRI.  Again we reviewed her options at length.  She is a candidate for arthroscopy and drilling of the avascular necrosis but I doubt she would resume weightbearing soon and if so I think she would eventually require total knee arthroplasty.  She is adamant that she wishes to proceed with proposed left total knee replacement. She wishes to remain active and she wishes to return to her job.         ICD-10-CM ICD-9-CM   1. Avascular necrosis of medial femoral condyle, left (CMS/[facility]) M87.052 733.43   2. Primary osteoarthritis of left knee M17.12 715.16   3. Left knee pain, unspecified chronicity M25.562 719.46            Cc:   Miki Bay MD              Scribed for Ingacio Moreno MD by Jeannine Moreno RN.12:28 PM 07/11/2018               "

## 2018-07-18 LAB
ALBUMIN SERPL-MCNC: 3.7 G/DL (ref 3.4–4.8)
ALBUMIN/GLOB SERPL: 1.4 G/DL (ref 1.5–2.5)
ALP SERPL-CCNC: 76 U/L (ref 35–104)
ALT SERPL W P-5'-P-CCNC: 15 U/L (ref 10–36)
ANION GAP SERPL CALCULATED.3IONS-SCNC: 9.2 MMOL/L (ref 3.6–11.2)
AST SERPL-CCNC: 19 U/L (ref 10–30)
BASOPHILS # BLD AUTO: 0.01 10*3/MM3 (ref 0–0.3)
BASOPHILS NFR BLD AUTO: 0.1 % (ref 0–2)
BILIRUB SERPL-MCNC: 0.7 MG/DL (ref 0.2–1.8)
BUN BLD-MCNC: 8 MG/DL (ref 7–21)
BUN/CREAT SERPL: 12.5 (ref 7–25)
CALCIUM SPEC-SCNC: 8.9 MG/DL (ref 7.7–10)
CHLORIDE SERPL-SCNC: 96 MMOL/L (ref 99–112)
CO2 SERPL-SCNC: 24.8 MMOL/L (ref 24.3–31.9)
CREAT BLD-MCNC: 0.64 MG/DL (ref 0.43–1.29)
DEPRECATED RDW RBC AUTO: 42.1 FL (ref 37–54)
EOSINOPHIL # BLD AUTO: 0.01 10*3/MM3 (ref 0–0.7)
EOSINOPHIL NFR BLD AUTO: 0.1 % (ref 0–7)
ERYTHROCYTE [DISTWIDTH] IN BLOOD BY AUTOMATED COUNT: 12.9 % (ref 11.5–14.5)
GFR SERPL CREATININE-BSD FRML MDRD: 91 ML/MIN/1.73
GLOBULIN UR ELPH-MCNC: 2.7 GM/DL
GLUCOSE BLD-MCNC: 135 MG/DL (ref 70–110)
HCT VFR BLD AUTO: 35.8 % (ref 37–47)
HGB BLD-MCNC: 12.1 G/DL (ref 12–16)
IMM GRANULOCYTES # BLD: 0.02 10*3/MM3 (ref 0–0.03)
IMM GRANULOCYTES NFR BLD: 0.2 % (ref 0–0.5)
LYMPHOCYTES # BLD AUTO: 0.61 10*3/MM3 (ref 1–3)
LYMPHOCYTES NFR BLD AUTO: 5.5 % (ref 16–46)
MAGNESIUM SERPL-MCNC: 3.1 MG/DL (ref 1.7–2.6)
MCH RBC QN AUTO: 31 PG (ref 27–33)
MCHC RBC AUTO-ENTMCNC: 33.8 G/DL (ref 33–37)
MCV RBC AUTO: 91.8 FL (ref 80–94)
MONOCYTES # BLD AUTO: 0.98 10*3/MM3 (ref 0.1–0.9)
MONOCYTES NFR BLD AUTO: 8.8 % (ref 0–12)
NEUTROPHILS # BLD AUTO: 9.48 10*3/MM3 (ref 1.4–6.5)
NEUTROPHILS NFR BLD AUTO: 85.3 % (ref 40–75)
OSMOLALITY SERPL CALC.SUM OF ELEC: 261.2 MOSM/KG (ref 273–305)
PHOSPHATE SERPL-MCNC: 3 MG/DL (ref 2.7–4.5)
PLATELET # BLD AUTO: 229 10*3/MM3 (ref 130–400)
PMV BLD AUTO: 10.2 FL (ref 6–10)
POTASSIUM BLD-SCNC: 3.2 MMOL/L (ref 3.5–5.3)
POTASSIUM BLD-SCNC: 3.7 MMOL/L (ref 3.5–5.3)
PROT SERPL-MCNC: 6.4 G/DL (ref 6–8)
RBC # BLD AUTO: 3.9 10*6/MM3 (ref 4.2–5.4)
SODIUM BLD-SCNC: 130 MMOL/L (ref 135–153)
WBC NRBC COR # BLD: 11.11 10*3/MM3 (ref 4.5–12.5)

## 2018-07-18 PROCEDURE — 94799 UNLISTED PULMONARY SVC/PX: CPT

## 2018-07-18 PROCEDURE — 80053 COMPREHEN METABOLIC PANEL: CPT | Performed by: PHYSICIAN ASSISTANT

## 2018-07-18 PROCEDURE — 97530 THERAPEUTIC ACTIVITIES: CPT

## 2018-07-18 PROCEDURE — 84132 ASSAY OF SERUM POTASSIUM: CPT | Performed by: INTERNAL MEDICINE

## 2018-07-18 PROCEDURE — 25010000002 HYDROMORPHONE PER 4 MG: Performed by: PHYSICIAN ASSISTANT

## 2018-07-18 PROCEDURE — 25010000003 CEFAZOLIN PER 500 MG: Performed by: ORTHOPAEDIC SURGERY

## 2018-07-18 PROCEDURE — 84100 ASSAY OF PHOSPHORUS: CPT | Performed by: PHYSICIAN ASSISTANT

## 2018-07-18 PROCEDURE — 99024 POSTOP FOLLOW-UP VISIT: CPT | Performed by: PHYSICIAN ASSISTANT

## 2018-07-18 PROCEDURE — 99233 SBSQ HOSP IP/OBS HIGH 50: CPT | Performed by: PHYSICIAN ASSISTANT

## 2018-07-18 PROCEDURE — 85025 COMPLETE CBC W/AUTO DIFF WBC: CPT | Performed by: PHYSICIAN ASSISTANT

## 2018-07-18 PROCEDURE — 83735 ASSAY OF MAGNESIUM: CPT | Performed by: PHYSICIAN ASSISTANT

## 2018-07-18 PROCEDURE — 97116 GAIT TRAINING THERAPY: CPT

## 2018-07-18 RX ORDER — DOCUSATE SODIUM 100 MG/1
100 CAPSULE, LIQUID FILLED ORAL 2 TIMES DAILY
Status: DISCONTINUED | OUTPATIENT
Start: 2018-07-18 | End: 2018-07-19 | Stop reason: HOSPADM

## 2018-07-18 RX ORDER — POLYETHYLENE GLYCOL 3350 17 G/17G
17 POWDER, FOR SOLUTION ORAL DAILY
Status: DISCONTINUED | OUTPATIENT
Start: 2018-07-18 | End: 2018-07-19 | Stop reason: HOSPADM

## 2018-07-18 RX ORDER — POTASSIUM CHLORIDE 750 MG/1
40 CAPSULE, EXTENDED RELEASE ORAL AS NEEDED
Status: DISCONTINUED | OUTPATIENT
Start: 2018-07-18 | End: 2018-07-19 | Stop reason: HOSPADM

## 2018-07-18 RX ORDER — SODIUM CHLORIDE 9 MG/ML
50 INJECTION, SOLUTION INTRAVENOUS CONTINUOUS
Status: DISCONTINUED | OUTPATIENT
Start: 2018-07-18 | End: 2018-07-19 | Stop reason: HOSPADM

## 2018-07-18 RX ORDER — POTASSIUM CHLORIDE 20 MEQ/1
40 TABLET, EXTENDED RELEASE ORAL EVERY 4 HOURS
Status: COMPLETED | OUTPATIENT
Start: 2018-07-18 | End: 2018-07-18

## 2018-07-18 RX ORDER — POTASSIUM CHLORIDE 1.5 G/1.77G
40 POWDER, FOR SOLUTION ORAL AS NEEDED
Status: DISCONTINUED | OUTPATIENT
Start: 2018-07-18 | End: 2018-07-19 | Stop reason: HOSPADM

## 2018-07-18 RX ADMIN — POTASSIUM CHLORIDE 40 MEQ: 1500 TABLET, EXTENDED RELEASE ORAL at 14:52

## 2018-07-18 RX ADMIN — DOCUSATE SODIUM 100 MG: 100 CAPSULE, LIQUID FILLED ORAL at 12:15

## 2018-07-18 RX ADMIN — CEFAZOLIN SODIUM 2 G: 2 SOLUTION INTRAVENOUS at 00:59

## 2018-07-18 RX ADMIN — APIXABAN 2.5 MG: 5 TABLET, FILM COATED ORAL at 20:19

## 2018-07-18 RX ADMIN — HYDROMORPHONE HYDROCHLORIDE 0.5 MG: 1 INJECTION, SOLUTION INTRAMUSCULAR; INTRAVENOUS; SUBCUTANEOUS at 08:04

## 2018-07-18 RX ADMIN — SODIUM CHLORIDE 50 ML/HR: 9 INJECTION, SOLUTION INTRAVENOUS at 12:14

## 2018-07-18 RX ADMIN — HYDROCHLOROTHIAZIDE 12.5 MG: 12.5 CAPSULE, GELATIN COATED ORAL at 08:37

## 2018-07-18 RX ADMIN — POTASSIUM CHLORIDE 40 MEQ: 1500 TABLET, EXTENDED RELEASE ORAL at 12:15

## 2018-07-18 RX ADMIN — POLYETHYLENE GLYCOL (3350) 17 G: 17 POWDER, FOR SOLUTION ORAL at 12:15

## 2018-07-18 RX ADMIN — DOCUSATE SODIUM 100 MG: 100 CAPSULE, LIQUID FILLED ORAL at 20:19

## 2018-07-18 RX ADMIN — APIXABAN 2.5 MG: 5 TABLET, FILM COATED ORAL at 08:38

## 2018-07-18 RX ADMIN — HYDROMORPHONE HYDROCHLORIDE 0.5 MG: 1 INJECTION, SOLUTION INTRAMUSCULAR; INTRAVENOUS; SUBCUTANEOUS at 01:21

## 2018-07-18 RX ADMIN — HYDROMORPHONE HYDROCHLORIDE 0.5 MG: 1 INJECTION, SOLUTION INTRAMUSCULAR; INTRAVENOUS; SUBCUTANEOUS at 21:19

## 2018-07-18 RX ADMIN — LISINOPRIL 20 MG: 10 TABLET ORAL at 08:38

## 2018-07-18 RX ADMIN — METOROPROLOL TARTRATE 2.5 MG: 5 INJECTION, SOLUTION INTRAVENOUS at 18:47

## 2018-07-18 RX ADMIN — SERTRALINE 50 MG: 50 TABLET, FILM COATED ORAL at 08:38

## 2018-07-18 NOTE — PROGRESS NOTES
Discharge Planning Assessment   [facility]     Patient Name: Aydee Becker  MRN: [id number]  Today's Date: 7/18/2018    Admit Date: 7/17/2018          Discharge Needs Assessment     Row Name 07/18/18 0768       Living Environment    Lives With child(brea), adult   Pt lives with daughterLaina and son in law.     Primary Care Provided by self    Provides Primary Care For no one    Family Caregiver if Needed child(brea), adult    Quality of Family Relationships unable to assess    Able to Return to Prior Arrangements yes       Transition Planning    Transportation Anticipated family or friend will provide   Daughter to provide transportation at discharge.        Discharge Needs Assessment    Equipment Currently Used at Home walker, rolling;commode   Pt has a rolling walker and bedside commode from [email or web address].     Outpatient/Agency/Support Group Needs --   Pt does not utilize home health services. Pt is agreeable [facility]. Ortho is aware that pt will need home health services.             Discharge Plan     Row Name 07/18/18 3200       Plan    Plan Pt lives with daughter, Laina and son in law and plans to return home at discharge. Pt does not utilize home health services. Pt is agreeable for [facility]. Pt has a rolling walker and bedside commode. PCP is NP @ [facility] and pt utilizes [facility] in [place], [place]. Pt is employed by [employer]. Pt does not have a POA or living will. SS to follow and assist as needed with discharge planning.     Patient/Family in Agreement with Plan yes          Demographic Summary     Row Name 07/18/18 1650       General Information    Referral Source nursing    Reason for Consult --   SS received consult left TKA/DC planning. SS spoke to pt on this date.      Blanca Polanco

## 2018-07-18 NOTE — PROGRESS NOTES
[facility]     Patient Identification:  Name:  Aydee Becker  Age:  73 y.o.  Sex:  female  :  1945  MRN:  [id number]  Visit Number:  [id number]  Primary Care Provider:  Miki Bay MD    Length of stay:  1    ----------------------------------------------------------------------------------------------------------------------  Subjective     Chief Complaint:  Left knee soreness     Subjective/Interval History:    Patient is a 74 yo female admitted per orthopedic surgery 2018 status post routine left total knee arthroplasty. Patient is POD #1. Today, the patient was resting in bed per my evaluation this morning. Patient reports that her pain in left knee, she reports the pain is more like soreness.  She states medication regimen she is currently on moderately reduces her pain.  She has not worked with physical therapy today, she has been up in bedside chair.  She delivered with physical therapy later on today.  Patient's blood pressure remains well controlled.  She denies any overnight events or acute complaints.  She denies any chest pain or shortness of breath.  No fevers or chills.  No new onset cough.  She denies any paresthesias.  She denies any abdominal pain, nausea, vomiting, or diarrhea.  She has not had a bowel movement postoperatively yet.  She denies any urinary symptoms.    Present during exam: N/A   ----------------------------------------------------------------------------------------------------------------------  Objective     Current [facility] Meds:    apixaban 2.5 mg Oral Q12H   bacitracin + polymyxin in normal saline 3000 mL IRRIGATION  Irrigation Once   lisinopril 20 mg Oral Daily   And      Hydrochlorothiazide Oral 12.5 mg Oral Daily   Latanoprostene Bunod 1 drop Both Eyes Nightly   sertraline 50 mg Oral Daily         ----------------------------------------------------------------------------------------------------------------------  Vital Signs:  Temp:  [97.4 °F (36.3 °C)-98.5 °F (36.9 °C)] 98.5 °F (36.9 °C)  Heart Rate:  [73-94] 86  Resp:  [12-19] 18  BP: (136-166)/(68-94) 147/68  No data found.    SpO2 Percentage    07/17/18 1900 07/17/18 2038 07/18/18 0401   SpO2: 95% 92% 91%     SpO2:  [91 %-100 %] 91 %  on  Flow (L/min):  [0-4] 0;   Device (Oxygen Therapy): room air    Body mass index is 27.63 kg/m².  Wt Readings from Last 3 Encounters:   07/17/18 70.8 kg (156 lb)   07/11/18 70.8 kg (156 lb)   07/11/18 70.8 kg (156 lb)        Intake/Output Summary (Last 24 hours) at 07/18/18 0858  Last data filed at 07/18/18 0408   Gross per 24 hour   Intake             1230 ml   Output              600 ml   Net              630 ml     Diet Regular  ----------------------------------------------------------------------------------------------------------------------  Physical exam:  Constitutional:  Well-developed and well-nourished.  No respiratory distress.  Pleasant.     HENT:  Head:  Normocephalic and atraumatic.  Mouth:  Moist mucous membranes.    Eyes:  Conjunctivae and EOM are normal.  Pupils are equal, round, and reactive to light.  No scleral icterus.    Neck:  Neck supple.  No JVD present.    Cardiovascular:  Normal rate, regular rhythm and normal heart sounds with no murmur.  Pulmonary/Chest:  No respiratory distress, no wheezes, no crackles, with normal breath sounds and good air movement.  Abdominal:  Soft. Bowel sounds are normal.  No distension and no tenderness.   Musculoskeletal:  Left lower extremity and knee immobilizer brace.  Bulky postoperative dressing in place.  Moderate swelling noted without calf tenderness.  She has good range of motion at the ankle joint.  Right lower extremity unremarkable.  Neurovascularly intact bilaterally.    Neurological:  Alert and oriented to person, place, and time.  No cranial nerve  deficit.  No tongue deviation.  No facial droop.  No slurred speech.   Skin:  Skin is warm and dry. No rash noted. No pallor.   Peripheral vascular:  Strong pulses in all 4 extremities with no clubbing, no cyanosis, no edema. Cap refill < 3 seconds.   Genitourinary: No monae catheter in place, making urine.   ----------------------------------------------------------------------------------------------------------------------  Tele:    Sinus 90s    I have personally reviewed the EKG/Telemetry strips   ----------------------------------------------------------------------------------------------------------------------            Results from last 7 days  Lab Units 07/18/18  0039 07/17/18  1618   WBC 10*3/mm3 11.11 12.40   HEMOGLOBIN g/dL 12.1 12.3   HEMATOCRIT % 35.8* 36.6*   MCV fL 91.8 91.0   MCHC g/dL 33.8 33.6   PLATELETS 10*3/mm3 229 213     Results from last 7 days  Lab Units 07/18/18  0039 07/17/18  1618   SODIUM mmol/L 130* 134*   POTASSIUM mmol/L 3.2* 3.5   MAGNESIUM mg/dL 3.1* 1.7   CHLORIDE mmol/L 96* 101   CO2 mmol/L 24.8 26.2   BUN mg/dL 8 9   CREATININE mg/dL 0.64 0.68   EGFR IF NONAFRICN AM mL/min/1.73 91 85   CALCIUM mg/dL 8.9 9.1   GLUCOSE mg/dL 135* 142*   ALBUMIN g/dL 3.70 3.80   BILIRUBIN mg/dL 0.7 0.5   ALK PHOS U/L 76 79   AST (SGOT) U/L 19 16   ALT (SGPT) U/L 15 14   Estimated Creatinine Clearance: 58.2 mL/min (by C-G formula based on SCr of 0.64 mg/dL).    Hemoglobin A1C   Date/Time Value Ref Range Status   07/17/2018 1618 5.50 4.50 - 5.70 % Final     Lab Results   Component Value Date    TSH 2.871 07/17/2018     I have personally reviewed the above laboratory results.   ----------------------------------------------------------------------------------------------------------------------  Imaging Results (last 24 hours)     Procedure Component Value Units Date/Time    XR Knee 1 or 2 View Left [[id number]] Collected:  07/17/18 1119     Updated:  07/17/18 1121    Narrative:       FINDINGS:    Patient is status post total left knee arthroplasty. Anatomic alignment.  No hardware complication.        Impression:       As above.     This report was finalized on 7/17/2018 11:19 AM by Dr. Malcolm Pompa MD.      I have personally reviewed the above radiology results.     Transthoracic Echocardiogram 6/26/2018    ----------------------------------------------------------------------------------------------------------------------  Assessment/Plan     -Left knee osteoarthritis with avascular necrosis status post left total knee arthroplasty POD # 1  -Essential hypertension, controlled   -Grade I diastolic dysfunction   -Mild pulmonary hypertension   -Mild hyperglycemia without history of diabetes mellitus, HgbA1C 5.50   -History of 1st degree AVB, NSR currently   -Hypomagnesemia    -Acute hypokalemia   -Mild hyponatremia   -Glaucoma   -Depression     Continue post operative care and pain management per primary (orthopedic surgery). Continue PT per protocol per ortho recommendations.Holding parameters on pain medications and BP medications to prevent hypoglycemia. BP remains well controlled, continue current BP regimen, DC HCTZ due to electrolyte abnormalities. Titrate medications if necessary. Continue to closely monitor BP.      Patient remains with mild hyperglycemia, A1C resulted at 5.50 indication adequate control. No need for SSI/Accuchecks. Continue to monitor. TSH WNL as well.     Mild hyponatremia as well as hypochloremia, hypomagnesemia, and hypokalemia noted today. Continue to encourage PO intake. Will start gentle IV fluid hydration. Hold home HCTZ.     Continue to encourage incentive spirometry. Start bowel regimen.    Continue to closely monitor electrolytes and replace per protocol as necessary. Repeat labs in AM and continue to closely monitor the patient on telemetry.       DVT Prophylaxis: Eliquis per orthopedic surgery     The patient is high risk due to the following diagnoses/reasons:  S/P  TKA, HTN, diastolic dysfunction, mild PaHTN, history of abnormal EKG    I have discussed the patient's assessment and plan with the patient.     Disposition: Per primary (orthopedic surgery), SS aiding in disposition planning, likely will need home health PT    CURTIS Romano  07/18/18  8:44 AM  ----------------------------------------------------------------------------------------------------------------------

## 2018-07-18 NOTE — PLAN OF CARE
Problem: Patient Care Overview  Goal: Plan of Care Review  Outcome: Ongoing (interventions implemented as appropriate)      Problem: Mobility, Physical Impaired (Adult)  Goal: Identify Related Risk Factors and Signs and Symptoms  Outcome: Ongoing (interventions implemented as appropriate)      Problem: Fall Risk (Adult)  Goal: Identify Related Risk Factors and Signs and Symptoms  Outcome: Ongoing (interventions implemented as appropriate)

## 2018-07-18 NOTE — PLAN OF CARE
Problem: Patient Care Overview  Goal: Plan of Care Review  Outcome: Ongoing (interventions implemented as appropriate)      Problem: Mobility, Physical Impaired (Adult)  Goal: Identify Related Risk Factors and Signs and Symptoms  Outcome: Ongoing (interventions implemented as appropriate)      Problem: Fall Risk (Adult)  Goal: Identify Related Risk Factors and Signs and Symptoms  Outcome: Ongoing (interventions implemented as appropriate)    Goal: Absence of Fall  Outcome: Ongoing (interventions implemented as appropriate)      Problem: Surgery Nonspecified (Adult)  Goal: Signs and Symptoms of Listed Potential Problems Will be Absent, Minimized or Managed (Surgery Nonspecified)  Outcome: Ongoing (interventions implemented as appropriate)

## 2018-07-18 NOTE — THERAPY TREATMENT NOTE
Acute Care - Physical Therapy Treatment Note   [facility]     Patient Name: Aydee Becker  : 1945  MRN: [id number]  Today's Date: 2018  Onset of Illness/Injury or Date of Surgery: 18 (admit date)  Date of Referral to PT: 18  Referring Physician: Tiffanie    Admit Date: 2018    Visit Dx:    ICD-10-CM ICD-9-CM   1. Primary osteoarthritis of left knee M17.12 715.16     Patient Active Problem List   Diagnosis   • Avascular necrosis of medial femoral condyle, left (CMS/HCC)   • Hypertension   • Glaucoma   • Primary osteoarthritis of left knee   • Murmur   • Abnormal ECG   • Preop cardiovascular exam       Therapy Treatment          Rehabilitation Treatment Summary     Row Name 18 1604             Treatment Time/Intention    Discipline physical therapist  -CT      Document Type therapy note (daily note)   BID  -CT      Subjective Information no complaints  -CT      Mode of Treatment individual therapy;physical therapy  -CT      Therapy Frequency (PT Clinical Impression) 2 times/day  -CT      Patient Effort excellent  -CT      Existing Precautions/Restrictions fall;weight bearing   WBAT LLE  -CT      Patient Response to Treatment Pt tolerated BID treatment sessions well with rest breaks provided as needed.   -CT      Recorded by [CT] Diana Moses, PT 18 1608      Row Name 18 1606             Cognitive Assessment/Intervention- PT/OT    Orientation Status (Cognition) oriented x 4  -CT      Follows Commands (Cognition) follows multi-step commands  -CT      Recorded by [CT] Diana Moses, PT 18 1608      Row Name 18 1609             Bed Mobility Assessment/Treatment    Bed Mobility Assessment/Treatment bed mobility (all) activities  -CT      [place] Level (Bed Mobility) moderate assist (50% patient effort)  -CT      Assistive Device (Bed Mobility) bed rails  -CT      Recorded by [CT] Diana Moses, PT 18 1608      Row Name 18 1602              Transfer Assessment/Treatment    Transfer Assessment/Treatment sit-stand transfer;stand-sit transfer  -CT      Recorded by [CT] Diana Moses, PT 07/18/18 1608      Row Name 07/18/18 1604             Sit-Stand Transfer    Sit-Stand [place] (Transfers) moderate assist (50% patient effort)  -CT      Assistive Device (Sit-Stand Transfers) walker, front-wheeled  -CT      Recorded by [CT] Diana Moses, PT 07/18/18 1608      Row Name 07/18/18 1604             Stand-Sit Transfer    Stand-Sit [place] (Transfers) moderate assist (50% patient effort)  -CT      Assistive Device (Stand-Sit Transfers) walker, front-wheeled  -CT      Recorded by [CT] Diana Moses, PT 07/18/18 1608      Row Name 07/18/18 1604             Gait/Stairs Assessment/Training    [place] Level (Gait) moderate assist (50% patient effort);verbal cues;nonverbal cues (demo/gesture)  -CT      Assistive Device (Gait) walker, front-wheeled  -CT      Distance in Feet (Gait) 8 ft in AM; 9 ft x 2 in PM  -CT      Pattern (Gait) step-to  -CT      Deviations/Abnormal Patterns (Gait) antalgic  -CT      Bilateral Gait Deviations forward flexed posture  -CT      Recorded by [CT] Diana Moses, PT 07/18/18 1608      Row Name 07/18/18 1604             Positioning and Restraints    Pre-Treatment Position in bed   in AM and PM  -CT      Post Treatment Position bed   in AM and PM  -CT      In Bed supine;call light within reach;encouraged to call for assist;notified nsg  -CT      Recorded by [CT] Diana Moses, PT 07/18/18 1608      Row Name 07/18/18 1604             Pain Scale: Numbers Pre/Post-Treatment    Pain Scale: Numbers, Pretreatment 7/10  -CT      Pain Scale: Numbers, Post-Treatment 8/10  -CT      Pain Location - Side Left  -CT      Pain Location knee  -CT      Recorded by [CT] Diana Moses, PT 07/18/18 1608      Row Name 07/18/18 1604             Respiratory WDL    Respiratory WDL WDL  -CT      Recorded by [CT] Diana Moses, PT 07/18/18 1608       Row Name 07/18/18 1604             Breath Sounds    Breath Sounds All Fields  -CT      All Lung Fields Breath Sounds clear;equal bilaterally  -CT      Recorded by [CT] Dianatoni Crouchell, PT 07/18/18 1608      Row Name 07/18/18 1604             Skin WDL    Skin WDL --  -CT      Skin Integrity --  -CT      Recorded by [CT] Diana Josué, PT 07/18/18 1608      Row Name 07/18/18 1604             Wound 07/17/18 1015 Left knee incision    Wound - Properties Group Date first assessed: 07/17/18 [WM] Time first assessed: 1015 [WM] Side: Left [WM] Location: knee [WM] Type: incision [WM] Recorded by:  [WM] Baudilio Garrido RN 07/17/18 1015    Dressing Appearance --  -CT      Closure --  -CT      Recorded by [CT] Diana Moses, PT 07/18/18 1608      Row Name 07/18/18 1604             Coping    Observed Emotional State --  -CT      Recorded by [CT] Diana Moses, PT 07/18/18 1608      Row Name 07/18/18 1604             Plan of Care Review    Plan of Care Reviewed With patient  -CT      Recorded by [CT] Dianatoni Crouchell, PT 07/18/18 1608      Row Name 07/18/18 1604             Outcome Summary/Treatment Plan (PT)    Daily Summary of Progress (PT) progress toward functional goals is good  -CT      Anticipated Equipment Needs at Discharge (PT) --  -CT      Anticipated Discharge Disposition (PT) --  -CT      Recorded by [CT] Diana Josué, PT 07/18/18 1608        User Key  (r) = Recorded By, (t) = Taken By, (c) = Cosigned By    Initials Name Effective Dates Discipline     Baudilio Garrido RN 06/16/16 -  Nurse    CT Diana Moses, PT 04/03/18 -  PT          Wound 07/17/18 1015 Left knee incision (Active)   Dressing Appearance dry;intact 7/18/2018  8:00 AM   Closure STEFANY 7/18/2018  8:00 AM   Care, Wound other (see comments) 7/18/2018  8:00 AM             Physical Therapy Education     Title: PT OT SLP Therapies (Done)     Topic: Physical Therapy (Done)     Point: Mobility training (Done)    Learning Progress Summary      Learner Status Readiness Method Response Comment Documented by    Patient Done Acceptance E,TB VU  CT 07/18/18 1608     Done Acceptance E,TB VU  DM 07/17/18 1829     Done Acceptance E,TB NR,VU  CT 07/17/18 1526          Point: Home exercise program (Done)    Learning Progress Summary     Learner Status Readiness Method Response Comment Documented by    Patient Done Acceptance E,TB VU  CT 07/18/18 1608     Done Acceptance E,TB VU  DM 07/17/18 1829     Done Acceptance E,TB NR,VU  CT 07/17/18 1526          Point: Body mechanics (Done)    Learning Progress Summary     Learner Status Readiness Method Response Comment Documented by    Patient Done Acceptance E,TB VU  CT 07/18/18 1608     Done Acceptance E,TB VU  DM 07/17/18 1829     Done Acceptance E,TB NR,VU  CT 07/17/18 1526          Point: Precautions (Done)    Learning Progress Summary     Learner Status Readiness Method Response Comment Documented by    Patient Done Acceptance E,TB VU  CT 07/18/18 1608     Done Acceptance E,TB VU  DM 07/17/18 1829     Done Acceptance E,TB NR,VU  CT 07/17/18 1526                      User Key     Initials Effective Dates Name Provider Type Discipline    DM 06/16/16 -  Adeola Jennings, RN Registered Nurse Nurse    CT 04/03/18 -  Diana Moses, PT Physical Therapist PT                    PT Recommendation and Plan  Anticipated Discharge Disposition (PT): home with home health, home with / care  Planned Therapy Interventions (PT Eval): balance training, bed mobility training, gait training, home exercise program, manual therapy techniques, motor coordination training, neuromuscular re-education, patient/family education, postural re-education, stair training, strengthening, transfer training  Therapy Frequency (PT Clinical Impression): 2 times/day  Outcome Summary/Treatment Plan (PT)  Daily Summary of Progress (PT): progress toward functional goals is good  Anticipated Equipment Needs at Discharge (PT): front wheeled  walker  Anticipated Discharge Disposition (PT): home with home health, home with 24/7 care  Plan of Care Reviewed With: patient  Progress: improving          Outcome Measures     Row Name 07/18/18 1600 07/17/18 1500          How much help from another person do you currently need...    Turning from your back to your side while in flat bed without using bedrails? 3  -CT 3  -CT     Moving from lying on back to sitting on the side of a flat bed without bedrails? 2  -CT 1  -CT     Moving to and from a bed to a chair (including a wheelchair)? 2  -CT 1  -CT     Standing up from a chair using your arms (e.g., wheelchair, bedside chair)? 2  -CT 1  -CT     Climbing 3-5 steps with a railing? 2  -CT 1  -CT     To walk in hospital room? 3  -CT 2  -CT     AM-PAC 6 Clicks Score 14  -CT 9  -CT        Functional Assessment    Outcome Measure Options AM-PAC 6 Clicks Basic Mobility (PT)  -CT AM-PAC 6 Clicks Basic Mobility (PT)  -CT       User Key  (r) = Recorded By, (t) = Taken By, (c) = Cosigned By    Initials Name Provider Type    CT Diana Moses PT Physical Therapist           Time Calculation:         PT Charges     Row Name 07/18/18 1611 07/18/18 1609          Time Calculation    PT Received On 07/18/18  -CT 07/18/18  -CT     PT - Next Appointment 07/19/18  -CT 07/19/18  -CT     PT Goal Re-Cert Due Date 07/31/18  -CT 07/31/18  -CT        Time Calculation- PT    Total Timed Code Minutes- PT 40 minute(s)   PM  -CT 31 minute(s)   AM  -CT        Timed Charges    47254 - Gait Training Minutes  20  -CT 15  -CT     50378 - PT Therapeutic Activity Minutes 20  -CT 16  -CT       User Key  (r) = Recorded By, (t) = Taken By, (c) = Cosigned By    Initials Name Provider Type    CT Diana Moses PT Physical Therapist          Therapy Charges for Today     Code Description Service Date Service Provider Modifiers Qty    90869222322 HC PT MOBILITY CURRENT 7/17/2018 Diana Moses PT GP, CL 1    32656515657 HC PT MOBILITY PROJECTED 7/17/2018  Diana Crouchell, PT GP, CK 1    19524907044 HC PT EVAL HIGH COMPLEXITY 2 7/17/2018 Diana Crouchell, PT GP 1    81861824982 HC GAIT TRAINING EA 15 MIN 7/17/2018 Diana Crouchell, PT GP 1    73901105968 HC PT THERAPEUTIC ACT EA 15 MIN 7/17/2018 Diana Crouchell, PT GP 1    53176839720 HC PT THER SUPP EA 15 MIN 7/17/2018 Diana Crouchell, PT GP 4    68178695537 HC GAIT TRAINING EA 15 MIN 7/18/2018 Diana Crouchell, PT GP 1    79659073471 HC PT THERAPEUTIC ACT EA 15 MIN 7/18/2018 Diana Crouchell, PT GP 1    32301421276 HC PT THER SUPP EA 15 MIN 7/18/2018 Diana Crouchell, PT GP 2    44346865967 HC GAIT TRAINING EA 15 MIN 7/18/2018 Diana Crouchell, PT GP 1    43521042097 HC PT THERAPEUTIC ACT EA 15 MIN 7/18/2018 Diana Crouchell, PT GP 2    32137475111 HC PT THER SUPP EA 15 MIN 7/18/2018 Diana Crouchell, PT GP 3          PT G-Codes  Outcome Measure Options: AM-PAC 6 Clicks Basic Mobility (PT)  Score: 9  Functional Limitation: Mobility: Walking and moving around  Mobility: Walking and Moving Around Current Status (): At least 60 percent but less than 80 percent impaired, limited or restricted  Mobility: Walking and Moving Around Goal Status (): At least 40 percent but less than 60 percent impaired, limited or restricted    Diana Moses, PT  7/18/2018

## 2018-07-18 NOTE — PLAN OF CARE
Problem: Patient Care Overview  Goal: Plan of Care Review  Outcome: Ongoing (interventions implemented as appropriate)   07/18/18 0318   Coping/Psychosocial   Plan of Care Reviewed With patient   Plan of Care Review   Progress improving

## 2018-07-18 NOTE — PHARMACY PATIENT ASSISTANCE
Pharmacy looked into pricing and coverage for new medication of eliquis.  Her copay will be $30 but we have profiled a free trial card which will cover her copay at discharge.  This is for post op dvt prophylaxis and their is usually not any refills.    Thank You;  Kacey Doran [facility]  07/18/18  2:21 PM

## 2018-07-18 NOTE — PROGRESS NOTES
Inpatient Progress Note  Aydee Becker  Date: 07/18/18  MRN: [id number]      Subjective:  Aydee Becker is a 73 y.o. female POD#1 Left TKA.  She is doing well this morning and sitting up in the bed.  She is complaining of moderate pain with any movement of the knee.  She is tolerating the knee immobilizer.  She denies paresthesias.  She has no new complaints this morning.    Objective:    Vitals:    07/17/18 2038 07/18/18 0000 07/18/18 0401 07/18/18 0625   BP:  148/70 152/72 147/68   BP Location:  Left arm Left arm Left arm   Patient Position:  Lying Lying Lying   Pulse:  94 89 86   Resp:    18   Temp:   98 °F (36.7 °C) 98.5 °F (36.9 °C)   TempSrc:    Oral   SpO2: 92%  91%    Weight:       Height:          She is alert and oriented ×3.  Examination of the left knee reveals occlusive bulky dressing intact.  She has moderate swelling.  No calf tenderness.  Active dorsiflexion and plantarflexion.  Her neurovascular status is intact.    Labs:      Results from last 7 days  Lab Units 07/18/18  0039 07/17/18  1618 07/11/18  0839   WBC 10*3/mm3 11.11 12.40 5.93   HEMOGLOBIN g/dL 12.1 12.3 13.8   HEMATOCRIT % 35.8* 36.6* 41.7   MCV fL 91.8 91.0 91.6   MCHC g/dL 33.8 33.6 33.1   PLATELETS 10*3/mm3 229 213 261           Results from last 7 days  Lab Units 07/18/18  0039 07/17/18  1618 07/11/18  0839   SODIUM mmol/L 130* 134* 136   POTASSIUM mmol/L 3.2* 3.5 3.7   MAGNESIUM mg/dL 3.1* 1.7  --    CHLORIDE mmol/L 96* 101 101   CO2 mmol/L 24.8 26.2 27.2   BUN mg/dL 8 9 15   CREATININE mg/dL 0.64 0.68 0.77   EGFR IF NONAFRICN AM mL/min/1.73 91 85 73   CALCIUM mg/dL 8.9 9.1 9.7   GLUCOSE mg/dL 135* 142* 79   ALBUMIN g/dL 3.70 3.80  --    BILIRUBIN mg/dL 0.7 0.5  --    ALK PHOS U/L 76 79  --    AST (SGOT) U/L 19 16  --    ALT (SGPT) U/L 15 14  --    Estimated Creatinine Clearance: 58.2 mL/min (by C-G formula based on SCr of 0.64 mg/dL).  No results found for: AMMONIA          Hemoglobin A1C   Date Value Ref Range Status    07/17/2018 5.50 4.50 - 5.70 % Final     Lab Results   Component Value Date    TSH 2.871 07/17/2018         Pain Management Panel     There is no flowsheet data to display.            Radiology:  Imaging Results (last 72 hours)     Procedure Component Value Units Date/Time    XR Knee 1 or 2 View Left [[id number]] Collected:  07/17/18 1119     Updated:  07/17/18 1121    Narrative:       EXAMINATION: XR KNEE 1 OR 2 VW LEFT-      CLINICAL INDICATION:post op        COMPARISON: None      TECHNIQUE: 2 views left knee     FINDINGS:   Patient is status post total left knee arthroplasty. Anatomic alignment.  No hardware complication.           Impression:       As above.     This report was finalized on 7/17/2018 11:19 AM by Dr. Malcolm Pompa MD.               Assessment:      ICD-10-CM ICD-9-CM   1. Primary osteoarthritis of left knee M17.12 715.16       Plan:  POD#1Left TKA.  Doing well.  Pain controlled.  Physical therapy will begin today per protocol with range of motion and weightbearing as tolerated.  We will continue to watch H&H.  We will continue Eliquis for DVT prophylaxis.   will initiate discharge planning today.  We'll continue to watch closely      CURTIS Pathak

## 2018-07-18 NOTE — PLAN OF CARE
Problem: Patient Care Overview  Goal: Discharge Needs Assessment  Outcome: Ongoing (interventions implemented as appropriate)  Discharge Planning Assessment   [facility]     Patient Name: Aydee Becker  MRN: [id number]  Today's Date: 7/18/2018    Admit Date: 7/17/2018          Discharge Needs Assessment     Row Name 07/18/18 1659       Living Environment    Lives With child(brea), adult   Pt lives with daughter, Laina and son in law.     Primary Care Provided by self    Provides Primary Care For no one    Family Caregiver if Needed child(brea), adult    Quality of Family Relationships unable to assess    Able to Return to Prior Arrangements yes       Transition Planning    Transportation Anticipated family or friend will provide   Daughter to provide transportation at discharge.        Discharge Needs Assessment    Equipment Currently Used at Home walker, rolling;commode   Pt has a rolling walker and bedside commode from borrow.     Outpatient/Agency/Support Group Needs --   Pt does not utilize home health services. Pt is agreeable to [facility]. Ortho is aware that pt will need home health services.             Discharge Plan     Row Name 07/18/18 2604       Plan    Plan Pt lives with daughter, Laina and son in law and plans to return home at discharge. Pt does not utilize home health services. Pt is agreeable for [facility]. Pt has a rolling walker and bedside commode. PCP is NP @ [facility] and pt utilizes [facility] in [place], [place]. Pt is employed by [employer]. Pt does not have a POA or living will. SS to follow and assist as needed with discharge planning.     Patient/Family in Agreement with Plan yes             Demographic Summary     Row Name 07/18/18 5838       General Information    Referral Source nursing    Reason for Consult --   SS received consult left TKA/DC planning. SS spoke to pt on this date.      Blanca Polanco

## 2018-07-19 VITALS
RESPIRATION RATE: 18 BRPM | BODY MASS INDEX: 27.64 KG/M2 | HEIGHT: 63 IN | DIASTOLIC BLOOD PRESSURE: 76 MMHG | WEIGHT: 156 LBS | TEMPERATURE: 98.4 F | HEART RATE: 82 BPM | OXYGEN SATURATION: 96 % | SYSTOLIC BLOOD PRESSURE: 138 MMHG

## 2018-07-19 LAB
ALBUMIN SERPL-MCNC: 3.6 G/DL (ref 3.4–4.8)
ALBUMIN/GLOB SERPL: 1.3 G/DL (ref 1.5–2.5)
ALP SERPL-CCNC: 87 U/L (ref 35–104)
ALT SERPL W P-5'-P-CCNC: 40 U/L (ref 10–36)
ANION GAP SERPL CALCULATED.3IONS-SCNC: 8.6 MMOL/L (ref 3.6–11.2)
AST SERPL-CCNC: 47 U/L (ref 10–30)
BASOPHILS # BLD AUTO: 0.01 10*3/MM3 (ref 0–0.3)
BASOPHILS NFR BLD AUTO: 0.1 % (ref 0–2)
BILIRUB SERPL-MCNC: 0.9 MG/DL (ref 0.2–1.8)
BUN BLD-MCNC: 9 MG/DL (ref 7–21)
BUN/CREAT SERPL: 16.4 (ref 7–25)
CALCIUM SPEC-SCNC: 9 MG/DL (ref 7.7–10)
CHLORIDE SERPL-SCNC: 97 MMOL/L (ref 99–112)
CO2 SERPL-SCNC: 22.4 MMOL/L (ref 24.3–31.9)
CREAT BLD-MCNC: 0.55 MG/DL (ref 0.43–1.29)
DEPRECATED RDW RBC AUTO: 42.6 FL (ref 37–54)
EOSINOPHIL # BLD AUTO: 0.02 10*3/MM3 (ref 0–0.7)
EOSINOPHIL NFR BLD AUTO: 0.2 % (ref 0–7)
ERYTHROCYTE [DISTWIDTH] IN BLOOD BY AUTOMATED COUNT: 13.2 % (ref 11.5–14.5)
GFR SERPL CREATININE-BSD FRML MDRD: 108 ML/MIN/1.73
GLOBULIN UR ELPH-MCNC: 2.7 GM/DL
GLUCOSE BLD-MCNC: 109 MG/DL (ref 70–110)
HCT VFR BLD AUTO: 34.1 % (ref 37–47)
HGB BLD-MCNC: 11.5 G/DL (ref 12–16)
IMM GRANULOCYTES # BLD: 0.03 10*3/MM3 (ref 0–0.03)
IMM GRANULOCYTES NFR BLD: 0.3 % (ref 0–0.5)
LYMPHOCYTES # BLD AUTO: 0.92 10*3/MM3 (ref 1–3)
LYMPHOCYTES NFR BLD AUTO: 8.2 % (ref 16–46)
MAGNESIUM SERPL-MCNC: 2.1 MG/DL (ref 1.7–2.6)
MCH RBC QN AUTO: 30.8 PG (ref 27–33)
MCHC RBC AUTO-ENTMCNC: 33.7 G/DL (ref 33–37)
MCV RBC AUTO: 91.4 FL (ref 80–94)
MONOCYTES # BLD AUTO: 1.3 10*3/MM3 (ref 0.1–0.9)
MONOCYTES NFR BLD AUTO: 11.6 % (ref 0–12)
NEUTROPHILS # BLD AUTO: 8.9 10*3/MM3 (ref 1.4–6.5)
NEUTROPHILS NFR BLD AUTO: 79.6 % (ref 40–75)
OSMOLALITY SERPL CALC.SUM OF ELEC: 254.5 MOSM/KG (ref 273–305)
PHOSPHATE SERPL-MCNC: 2.3 MG/DL (ref 2.7–4.5)
PLATELET # BLD AUTO: 227 10*3/MM3 (ref 130–400)
PMV BLD AUTO: 10.3 FL (ref 6–10)
POTASSIUM BLD-SCNC: 3.9 MMOL/L (ref 3.5–5.3)
PROT SERPL-MCNC: 6.3 G/DL (ref 6–8)
RBC # BLD AUTO: 3.73 10*6/MM3 (ref 4.2–5.4)
SODIUM BLD-SCNC: 128 MMOL/L (ref 135–153)
WBC NRBC COR # BLD: 11.18 10*3/MM3 (ref 4.5–12.5)

## 2018-07-19 PROCEDURE — 80053 COMPREHEN METABOLIC PANEL: CPT | Performed by: PHYSICIAN ASSISTANT

## 2018-07-19 PROCEDURE — 97116 GAIT TRAINING THERAPY: CPT

## 2018-07-19 PROCEDURE — 83735 ASSAY OF MAGNESIUM: CPT | Performed by: PHYSICIAN ASSISTANT

## 2018-07-19 PROCEDURE — 99233 SBSQ HOSP IP/OBS HIGH 50: CPT | Performed by: PHYSICIAN ASSISTANT

## 2018-07-19 PROCEDURE — 85025 COMPLETE CBC W/AUTO DIFF WBC: CPT | Performed by: PHYSICIAN ASSISTANT

## 2018-07-19 PROCEDURE — 84100 ASSAY OF PHOSPHORUS: CPT | Performed by: PHYSICIAN ASSISTANT

## 2018-07-19 PROCEDURE — 99024 POSTOP FOLLOW-UP VISIT: CPT | Performed by: PHYSICIAN ASSISTANT

## 2018-07-19 PROCEDURE — 97530 THERAPEUTIC ACTIVITIES: CPT

## 2018-07-19 RX ORDER — LISINOPRIL 20 MG/1
20 TABLET ORAL DAILY
Qty: 14 TABLET | Refills: 0 | Status: SHIPPED | OUTPATIENT
Start: 2018-07-20

## 2018-07-19 RX ORDER — OXYCODONE HYDROCHLORIDE AND ACETAMINOPHEN 5; 325 MG/1; MG/1
1 TABLET ORAL EVERY 4 HOURS PRN
Qty: 40 TABLET | Refills: 0 | Status: SHIPPED | OUTPATIENT
Start: 2018-07-19 | End: 2018-08-03

## 2018-07-19 RX ORDER — DOCUSATE SODIUM 100 MG/1
100 CAPSULE, LIQUID FILLED ORAL 2 TIMES DAILY PRN
Qty: 20 CAPSULE | Refills: 0 | Status: SHIPPED | OUTPATIENT
Start: 2018-07-19

## 2018-07-19 RX ADMIN — DOCUSATE SODIUM 100 MG: 100 CAPSULE, LIQUID FILLED ORAL at 09:22

## 2018-07-19 RX ADMIN — OXYCODONE HYDROCHLORIDE AND ACETAMINOPHEN 1 TABLET: 5; 325 TABLET ORAL at 09:21

## 2018-07-19 RX ADMIN — APIXABAN 2.5 MG: 5 TABLET, FILM COATED ORAL at 09:22

## 2018-07-19 RX ADMIN — SERTRALINE 50 MG: 50 TABLET, FILM COATED ORAL at 09:22

## 2018-07-19 RX ADMIN — SODIUM CHLORIDE 50 ML/HR: 9 INJECTION, SOLUTION INTRAVENOUS at 07:28

## 2018-07-19 RX ADMIN — LISINOPRIL 20 MG: 10 TABLET ORAL at 09:22

## 2018-07-19 RX ADMIN — POLYETHYLENE GLYCOL (3350) 17 G: 17 POWDER, FOR SOLUTION ORAL at 09:22

## 2018-07-19 RX ADMIN — OXYCODONE HYDROCHLORIDE AND ACETAMINOPHEN 1 TABLET: 5; 325 TABLET ORAL at 02:54

## 2018-07-19 NOTE — DISCHARGE INSTRUCTIONS
Keep gauze on knee until drainage has stopped. Use ice pack. 20 mins on every 4-6 hours for swelling.

## 2018-07-19 NOTE — DISCHARGE INSTR - APPOINTMENTS
You have a scheduled follow-up appointment with Dr. Bay on 7/27/18 at 10:30am. Office number [phone]

## 2018-07-19 NOTE — PLAN OF CARE
Problem: Patient Care Overview  Goal: Plan of Care Review  Outcome: Ongoing (interventions implemented as appropriate)   07/19/18 1400   Coping/Psychosocial   Plan of Care Reviewed With patient   Plan of Care Review   Progress improving

## 2018-07-19 NOTE — PROGRESS NOTES
[facility]     Patient Identification:  Name:  Aydee Becker  Age:  73 y.o.  Sex:  female  :  1945  MRN:  [id number]  Visit Number:  [id number]  Primary Care Provider:  Miki Bay MD    Length of stay:  2    ----------------------------------------------------------------------------------------------------------------------  Subjective     Chief Complaint:  Left knee soreness     Subjective/Interval History:    Patient is a 74 yo female admitted per orthopedic surgery 2018 status post routine left total knee arthroplasty. Patient is POD #2. Today, the patient was resting in bedside chair per my evaluation this morning. She is being discharged home per primary (orthopedic surgery) with home health and PT. She continues to report soreness in her left knee, moderately controlled with current pain medication regimen. BP remains well controlled. No overnight complaints or acute complaints. No fevers or chills.  No new onset cough.  She denies any paresthesias.  She denies any abdominal pain, nausea, vomiting, or diarrhea.  She has has had BM post operatively. She denies any urinary symptoms.    Present during exam: Patient's daughter.   ----------------------------------------------------------------------------------------------------------------------  Objective     Current Hospital Meds:    apixaban 2.5 mg Oral Q12H   bacitracin + polymyxin in normal saline 3000 mL IRRIGATION  Irrigation Once   docusate sodium 100 mg Oral BID   Latanoprostene Bunod 1 drop Both Eyes Nightly   lisinopril 20 mg Oral Daily   polyethylene glycol 17 g Oral Daily   sertraline 50 mg Oral Daily       sodium chloride 50 mL/hr Last Rate: 50 mL/hr (18)     ----------------------------------------------------------------------------------------------------------------------  Vital Signs:  Temp:  [97.6 °F (36.4 °C)-98.8 °F (37.1 °C)] 98.4 °F (36.9  °C)  Heart Rate:  [82-91] 82  Resp:  [16-19] 18  BP: (125-175)/(63-79) 138/76  No data found.    SpO2 Percentage    07/18/18 1830 07/18/18 2359 07/19/18 0030   SpO2: 95% 94% 96%     SpO2:  [94 %-96 %] 96 %  on   ;   Device (Oxygen Therapy): room air    Body mass index is 27.63 kg/m².  Wt Readings from Last 3 Encounters:   07/17/18 70.8 kg (156 lb)   07/11/18 70.8 kg (156 lb)   07/11/18 70.8 kg (156 lb)        Intake/Output Summary (Last 24 hours) at 07/19/18 0822  Last data filed at 07/19/18 0728   Gross per 24 hour   Intake          2451.67 ml   Output                0 ml   Net          2451.67 ml     Diet Regular  ----------------------------------------------------------------------------------------------------------------------  Physical exam:  Constitutional:  Well-developed and well-nourished.  No respiratory distress.  Pleasant. Sitting up in bedside chair.     HENT:  Head:  Normocephalic and atraumatic.  Mouth:  Moist mucous membranes.    Eyes:  Conjunctivae and EOM are normal.  Pupils are equal, round, and reactive to light.  No scleral icterus.    Neck:  Neck supple.  No JVD present.    Cardiovascular:  Normal rate, regular rhythm and normal heart sounds with no murmur.  Pulmonary/Chest:  No respiratory distress, no wheezes, no crackles, with normal breath sounds and good air movement.  Abdominal:  Soft. Bowel sounds are normal.  No distension and no tenderness.   Musculoskeletal:  Left lower extremity with knee immobilizer removed, dressing open to air. Incision clean dry and intact.   Bulky postoperative dressing in place. Mild bleeding at proximal wound edge.  Moderate swelling noted without calf tenderness.  She has good range of motion at the ankle joint.  Right lower extremity unremarkable.  Neurovascularly intact bilaterally.    Neurological:  Alert and oriented to person, place, and time.  No cranial nerve deficit.  No tongue deviation.  No facial droop.  No slurred speech.   Skin:  Skin is warm  and dry. No rash noted. No pallor.   Peripheral vascular:  Strong pulses in all 4 extremities with no clubbing, no cyanosis, no edema. Cap refill < 3 seconds.   Genitourinary: No monae catheter in place, making urine.   ----------------------------------------------------------------------------------------------------------------------  Tele:    Sinus 80s    I have personally reviewed the EKG/Telemetry strips   ----------------------------------------------------------------------------------------------------------------------              Results from last 7 days  Lab Units 07/19/18 0058 07/18/18  0039 07/17/18  1618   WBC 10*3/mm3 11.18 11.11 12.40   HEMOGLOBIN g/dL 11.5* 12.1 12.3   HEMATOCRIT % 34.1* 35.8* 36.6*   MCV fL 91.4 91.8 91.0   MCHC g/dL 33.7 33.8 33.6   PLATELETS 10*3/mm3 227 229 213       Results from last 7 days  Lab Units 07/19/18 0058 07/18/18  1850 07/18/18  0039 07/17/18  1618   SODIUM mmol/L 128*  --  130* 134*   POTASSIUM mmol/L 3.9 3.7 3.2* 3.5   MAGNESIUM mg/dL 2.1  --  3.1* 1.7   CHLORIDE mmol/L 97*  --  96* 101   CO2 mmol/L 22.4*  --  24.8 26.2   BUN mg/dL 9  --  8 9   CREATININE mg/dL 0.55  --  0.64 0.68   EGFR IF NONAFRICN AM mL/min/1.73 108  --  91 85   CALCIUM mg/dL 9.0  --  8.9 9.1   GLUCOSE mg/dL 109  --  135* 142*   ALBUMIN g/dL 3.60  --  3.70 3.80   BILIRUBIN mg/dL 0.9  --  0.7 0.5   ALK PHOS U/L 87  --  76 79   AST (SGOT) U/L 47*  --  19 16   ALT (SGPT) U/L 40*  --  15 14   Estimated Creatinine Clearance: 58.2 mL/min (by C-G formula based on SCr of 0.55 mg/dL).    Hemoglobin A1C   Date/Time Value Ref Range Status   07/17/2018 1618 5.50 4.50 - 5.70 % Final     Lab Results   Component Value Date    TSH 2.871 07/17/2018     I have personally reviewed the above laboratory results.   ----------------------------------------------------------------------------------------------------------------------  Imaging Results (last 24 hours)     Procedure Component Value Units Date/Time     XR Knee 1 or 2 View Left [[id number]] Collected:  07/17/18 1119     Updated:  07/17/18 1121    Narrative:       FINDINGS:   Patient is status post total left knee arthroplasty. Anatomic alignment.  No hardware complication.        Impression:       As above.     This report was finalized on 7/17/2018 11:19 AM by Dr. Malcolm Pompa MD.      I have personally reviewed the above radiology results.     Transthoracic Echocardiogram 6/26/2018    ----------------------------------------------------------------------------------------------------------------------  Assessment/Plan     -Left knee osteoarthritis with avascular necrosis status post left total knee arthroplasty POD # 2  -Essential hypertension, controlled   -Grade I diastolic dysfunction   -Mild pulmonary hypertension   -Mild hyperglycemia without history of diabetes mellitus, HgbA1C 5.50   -History of 1st degree AVB, NSR currently   -Hypomagnesemia, resolved with supplementation     -Acute hypokalemia, resolved with supplementation  -Hypophosphatemia    -Hyponatremia   -Glaucoma   -Depression     Continue post operative care and pain management per primary (orthopedic surgery). Continue PT per protocol per ortho recommendations. Holding parameters on pain medications and BP medications to prevent hypotension. BP remains well controlled, continue current BP regimen. Patient is being discharged home today with home health per primary. I have held Lisinopril/HCTZ on discharge, and prescribed the patient Lisinopril only. I have requested follow up with PCP in one week post discharge for repeat CMP due to electrolyte abnormalities. Patient's sodium slightly lower today, encouraged patient on adequate oral fluid intake. She reports she has not been eating or drinking as she usually does at home. Patient verbalized understanding of instructions. Continue to encourage incentive spirometry, one will be sent home with patient on discharge. Recommend continuing stool  softeners on discharge with narcotic analgesia to prevent post operative constipation and sequelae.       DVT Prophylaxis: Eliquis per orthopedic surgery     The patient is high risk due to the following diagnoses/reasons:  S/P TKA, HTN, diastolic dysfunction, mild PaHTN, history of abnormal EKG    I have discussed the patient's assessment and plan with the patient.     Disposition: Today per primary (ortho surgery) with home health and physical therapy     Please CC: MD Renea Hylton PA  07/19/18  8:22 AM  ----------------------------------------------------------------------------------------------------------------------

## 2018-07-19 NOTE — DISCHARGE PLACEMENT REQUEST
"Hans Becker  (73 y.o. Female)     Date of Birth Social Security Number Address Home Phone MRN    1945  [address] [facility] [address] [phone] [id number]    Sikh Marital Status          Gnosticist        Admission Date Admission Type Admitting Provider Attending Provider Department, Room/Bed    7/17/18 Elective Ignacio Moreno MD Belhasen, Ronald Keith, MD [address], [address]/    Discharge Date Discharge Disposition Discharge Destination         Home or Self Care              Attending Provider:  Ignacio Moreno MD    Allergies:  No Known Allergies    Isolation:  None   Infection:  None   Code Status:  CPR    Ht:  160 cm (63\")   Wt:  70.8 kg (156 lb)    Admission Cmt:  None   Principal Problem:  Primary osteoarthritis of left knee [M17.12]                 Active Insurance as of 7/17/2018     Primary Coverage     Payor Plan Insurance Group Employer/Plan Group    ANTH BLUE CROSS ANTH BLUE CROSS BLUE SHIELD PPO [id number]     Payor Plan Address Payor Plan Phone Number Effective From Effective To    [address] [phone] 3/1/2018     [facility] [id number]       Subscriber Name Subscriber Birth Date Member ID       HANS BECKER 1945 [id number]           Secondary Coverage     Payor Plan Insurance Group Employer/Plan Group    MEDICARE MEDICARE A & B      Payor Plan Address Payor Plan Phone Number Effective From Effective To    [address] [phone] 6/1/2009     [facility] [id number]       Subscriber Name Subscriber Birth Date Member ID       HANS BECKER 1945 [id number]                 Emergency Contacts      (Rel.) Home Phone Work Phone Mobile Phone    Laina Philip (Daughter) [phone] -- [phone]        [address] [address]  Phone:  [phone]  Fax:   Date: Jul 19, 2018      Ambulatory Referral to Home Health     Patient:  Hans Becker MRN:  [id number] "   [address]  [facility] [address] :  1945  SSN:    Phone: [phone] Sex:  F      INSURANCE PAYOR PLAN GROUP # SUBSCRIBER ID   Primary:  Secondary:    SHYAM BLUE CROSS  MEDICARE [id number]  [id number]    [id number]  [id number]      Referring Provider Information:  KINGSTON MORENO Phone: [phone] Fax:       Referral Information:   # Visits:  1 Referral Type: Home Health [42]   Urgency:  Routine Referral Reason: Specialty Services Required   Start Date: 2018 End Date:  To be determined by Insurer   Diagnosis: Primary osteoarthritis of left knee (M17.12 [ICD-10-CM] 715.16 [ICD-9-CM])  Avascular necrosis of medial femoral condyle, left (CMS/HCC) (M87.052 [ICD-10-CM] 733.43 [ICD-9-CM])      Refer to Dept:   Refer to Provider:   Refer to Facility:       Face to Face Visit Date: 2018  Follow-up Provider for Plan of Care? I will be treating the patient on an ongoing basis.  Please send me the Plan of Care for signature.  Follow-up Provider: KINGSTON MORENO [[id number]]  Reason/Clinical Findings: s/p Total knee Arthroplasty  Describe mobility limitations that make leaving home difficult: post op weakness  Nursing/Therapeutic Services Requested: Physical Therapy  PT orders: Total joint pathway  PT orders: Gait Training  Weight Bearing Status: As Tolerated     This document serves as a request of services and does not constitute Insurance authorization or approval of services.  To determine eligibility, please contact the members Insurance carrier to verify and review coverage.     If you have medical questions regarding this request for services. Please contact [address] at [phone] between the hours of 8:00am - 5:00pm (Mon-Fri).             Verbal Order Mode: Verbal with readback   Authorizing Provider: Kingston Moreno MD  Authorizing Provider's NPI: [id number]     Order Entered By: CURTIS Pathak 2018 10:22 AM     Electronically  signed by:                    History & Physical      Ignacio Moreno MD at 7/17/2018  7:25 AM        H&P reviewed. The patient was examined and there are no changes to the H&P.    Electronically signed by Ignacio Moreno MD at 7/17/2018  7:26 AM   Source Note             History and Physical        Patient: Aydee Becker  YOB: 1945  Date of Encounter: 07/11/2018        HPI:   Aydee Becker, 73 y.o. female, returns today for pre-operative surgical update for left total knee arthroplasty scheduled 07/17/2018. She has completed medical and cardiac clearance.  Her symptoms remain the same.  She is severely limited due to her knee pain.  She wishes to remain active and work but she is not able to do so.  She continues to have significant pain with weightbearing activities.  She also has difficulty getting out of a squatted position such as a chair and she has had intra-articular steroid injection without improvement.  MRI obtained in the past shows large area of avascular necrosis medial femoral condyle.     Active Problem List:      Patient Active Problem List   Diagnosis   • Avascular necrosis of medial femoral condyle, left (CMS/HCC)   • Hypertension   • Glaucoma   • Primary osteoarthritis of left knee   • Murmur   • Abnormal ECG   • Preop cardiovascular exam         Past Medical History:  Medical History        Past Medical History:   Diagnosis Date   • Arthritis     • Hypertension     • Knee pain, left              Past Surgical History:  Surgical History         Past Surgical History:   Procedure Laterality Date   • CATARACT EXTRACTION Left       with stent placement for glaucoma            Family History:        Family History   Problem Relation Age of Onset   • Rheum arthritis Mother     • Hypertension Mother     • Rheum arthritis Father     • Gout Sister     • Gout Brother           Social History:  Social History   Social History            Social History   • Marital status:         Spouse name: N/A   • Number of children: N/A   • Years of education: N/A          Occupational History   • Not on file.           Social History Main Topics   • Smoking status: Never Smoker   • Smokeless tobacco: Never Used   • Alcohol use No   • Drug use: No   • Sexual activity: Defer           Other Topics Concern   • Not on file          Social History Narrative   • No narrative on file         Patient's  BMI is above normal parameters. Recommendations include: educational material.        Medications:  Current Medications          Current Outpatient Prescriptions   Medication Sig Dispense Refill   • HYDROcodone-acetaminophen (NORCO) 7.5-325 MG per tablet Take 1 tablet by mouth Every 6 (Six) Hours As Needed for Moderate Pain . 20 tablet 0   • Latanoprostene Bunod (VYZULTA) 0.024 % solution Administer 1 drop to both eyes Every Night.       • lisinopril-hydrochlorothiazide (PRINZIDE,ZESTORETIC) 20-12.5 MG per tablet Take 1 tablet by mouth Daily.   0   • sertraline (ZOLOFT) 50 MG tablet Take 50 mg by mouth Daily.   0      No current facility-administered medications for this visit.             Allergies:  No Known Allergies     Review of Systems:   Review of Systems   Constitutional: Negative.    HENT: Negative.    Eyes: Negative.    Respiratory: Negative.    Cardiovascular: Negative.    Gastrointestinal: Negative.    Endocrine: Negative.    Genitourinary: Negative.    Musculoskeletal: Positive for arthralgias and joint swelling.   Skin: Negative.    Allergic/Immunologic: Negative.    Neurological: Negative.    Hematological: Negative.    Psychiatric/Behavioral: Negative.          Physical Exam:   Physical Exam   Constitutional: She is oriented to person, place, and time. She appears well-developed and well-nourished. No distress.   HENT:   Head: Normocephalic and atraumatic.   Eyes: EOM are normal. Right eye exhibits no discharge. Left eye exhibits no discharge.   Neck: Normal range of motion. Neck  "supple.   Cardiovascular: Normal rate and regular rhythm.    Murmur heard.  Pulmonary/Chest: Effort normal and breath sounds normal. No respiratory distress. She has no wheezes. She has no rales.   Abdominal: Soft. Bowel sounds are normal. She exhibits no distension. There is no tenderness.   Neurological: She is alert and oriented to person, place, and time.   Skin: Skin is warm and dry. No rash noted. She is not diaphoretic. No erythema.   Psychiatric: She has a normal mood and affect. Her behavior is normal. Judgment and thought content normal.      GENERAL: 73 y.o. female, alert and oriented X 3 in no acute distress.   Visit Vitals  /78   Pulse 68   Ht 160 cm (63\")   Wt 70.8 kg (156 lb)   BMI 27.63 kg/m²      Musculoskeletal: Left knee evaluation reveals mild effusion with moderate tenderness along the medial joint line.  She demonstrates full extension, full flexion, no significant crepitus with flexion-extension of her knee, no gross stability with varus valgus stressing.  Neurovascular grossly intact.     Radiology/Labs:   Previous radiographs and MRI left knee show large area of avascular necrosis medial femoral condyle with subchondral collapse.     Assessment & Plan:   73 y.o. female with left knee pain related to avascular necrosis with joint incongruity seen on the radiographs and MRI.  Again we reviewed her options at length.  She is a candidate for arthroscopy and drilling of the avascular necrosis but I doubt she would resume weightbearing soon and if so I think she would eventually require total knee arthroplasty.  She is adamant that she wishes to proceed with proposed left total knee replacement. She wishes to remain active and she wishes to return to her job.         ICD-10-CM ICD-9-CM   1. Avascular necrosis of medial femoral condyle, left (CMS/[facility]) M87.052 733.43   2. Primary osteoarthritis of left knee M17.12 715.16   3. Left knee pain, unspecified chronicity M25.562 719.46            Cc: "   Miki Bay MD              Scribed for Ignacio Moreno MD by Jeannine Moreno RN.12:28 PM 07/11/2018                  Electronically signed by Ignacio Moreno MD at 7/11/2018  5:15 PM             Ignacio Moreno MD at 7/11/2018  5:12 PM          History and Physical        Patient: Aydee Becker  YOB: 1945  Date of Encounter: 07/11/2018        HPI:   Aydee Becker, 73 y.o. female, returns today for pre-operative surgical update for left total knee arthroplasty scheduled 07/17/2018. She has completed medical and cardiac clearance.  Her symptoms remain the same.  She is severely limited due to her knee pain.  She wishes to remain active and work but she is not able to do so.  She continues to have significant pain with weightbearing activities.  She also has difficulty getting out of a squatted position such as a chair and she has had intra-articular steroid injection without improvement.  MRI obtained in the past shows large area of avascular necrosis medial femoral condyle.     Active Problem List:      Patient Active Problem List   Diagnosis   • Avascular necrosis of medial femoral condyle, left (CMS/HCC)   • Hypertension   • Glaucoma   • Primary osteoarthritis of left knee   • Murmur   • Abnormal ECG   • Preop cardiovascular exam         Past Medical History:  Medical History        Past Medical History:   Diagnosis Date   • Arthritis     • Hypertension     • Knee pain, left              Past Surgical History:  Surgical History         Past Surgical History:   Procedure Laterality Date   • CATARACT EXTRACTION Left       with stent placement for glaucoma            Family History:        Family History   Problem Relation Age of Onset   • Rheum arthritis Mother     • Hypertension Mother     • Rheum arthritis Father     • Gout Sister     • Gout Brother           Social History:  Social History   Social History            Social History   • Marital status:  "supple.   Cardiovascular: Normal rate and regular rhythm.    Murmur heard.  Pulmonary/Chest: Effort normal and breath sounds normal. No respiratory distress. She has no wheezes. She has no rales.   Abdominal: Soft. Bowel sounds are normal. She exhibits no distension. There is no tenderness.   Neurological: She is alert and oriented to person, place, and time.   Skin: Skin is warm and dry. No rash noted. She is not diaphoretic. No erythema.   Psychiatric: She has a normal mood and affect. Her behavior is normal. Judgment and thought content normal.      GENERAL: 73 y.o. female, alert and oriented X 3 in no acute distress.   Visit Vitals  /78   Pulse 68   Ht 160 cm (63\")   Wt 70.8 kg (156 lb)   BMI 27.63 kg/m²      Musculoskeletal: Left knee evaluation reveals mild effusion with moderate tenderness along the medial joint line.  She demonstrates full extension, full flexion, no significant crepitus with flexion-extension of her knee, no gross stability with varus valgus stressing.  Neurovascular grossly intact.     Radiology/Labs:   Previous radiographs and MRI left knee show large area of avascular necrosis medial femoral condyle with subchondral collapse.     Assessment & Plan:   73 y.o. female with left knee pain related to avascular necrosis with joint incongruity seen on the radiographs and MRI.  Again we reviewed her options at length.  She is a candidate for arthroscopy and drilling of the avascular necrosis but I doubt she would resume weightbearing soon and if so I think she would eventually require total knee arthroplasty.  She is adamant that she wishes to proceed with proposed left total knee replacement. She wishes to remain active and she wishes to return to her job.         ICD-10-CM ICD-9-CM   1. Avascular necrosis of medial femoral condyle, left (CMS/[facility]) M87.052 733.43   2. Primary osteoarthritis of left knee M17.12 715.16   3. Left knee pain, unspecified chronicity M25.562 719.46            Cc: "   Miki Bay MD              Scribed for Ignacio Moreno MD by Jeannine Moreno RN.12:28 PM 07/11/2018                 Electronically signed by Ignacio Moreno MD at 7/11/2018  5:15 PM             ICU Vital Signs     Row Name 07/19/18 0632 07/19/18 0438 07/19/18 0030 07/18/18 2359 07/18/18 2000       Vitals    Temp 98.4 °F (36.9 °C) 98.8 °F (37.1 °C) 97.6 °F (36.4 °C)  --  --    Temp src Oral Oral Oral  --  --    Pulse 82 82 89  --  --    Heart Rate Source Monitor Monitor Monitor  --  --    Resp 18  --  --  --  --    Resp Rate Source Visual  --  --  --  --    /76 133/69 125/63  --  --    BP Location Right arm Left arm Left arm  --  --    BP Method Automatic Automatic Automatic  --  --    Patient Position Lying Lying Lying  --  --       Oxygen Therapy    SpO2  --  -- 96 % 94 %  --    Pulse Oximetry Type  --  --  -- Intermittent  --    Device (Oxygen Therapy)  --  --  -- room air room air    Row Name 07/18/18 1830 07/18/18 1602 07/18/18 1242             Vitals    Temp 98.1 °F (36.7 °C) 98.8 °F (37.1 °C)  --      Temp src Oral Oral  --      Pulse 88 90 91      Heart Rate Source Monitor Monitor Monitor      Resp 19 16 16      Resp Rate Source Visual Visual Visual      /79 152/79 142/72      BP Location Left arm Left arm Left arm      BP Method Automatic Automatic Automatic      Patient Position Lying Lying Lying         Oxygen Therapy    SpO2 95 %  --  --          Intake & Output (last day)       07/18 0701 - 07/19 0700 07/19 0701 - 07/20 0700    P.O. 1160     I.V. (mL/kg) 291.7 (4.1) 1000 (14.1)    Total Intake(mL/kg) 1451.7 (20.5) 1000 (14.1)    Net +1451.7 +1000          Unmeasured Urine Occurrence 9 x     Unmeasured Stool Occurrence 1 x         Hospital Medications (active)       Dose Frequency Start End    apixaban (ELIQUIS) tablet 2.5 mg 2.5 mg Every 12 Hours Scheduled 7/18/2018     Sig - Route: Take 1 tablet by mouth Every 12 (Twelve) Hours. - Oral    bacitracin 150,000  "Units, polymyxin B 1,500,000 Units in sodium chloride (NS) 3,000 mL irrigation  Once 7/17/2018     Sig - Route: Irrigate with  as directed 1 (One) Time. - Irrigation    docusate sodium (COLACE) capsule 100 mg 100 mg 2 Times Daily 7/18/2018     Sig - Route: Take 1 capsule by mouth 2 (Two) Times a Day. - Oral    Cosign for Ordering: Accepted by Shweta Ceja MD on 7/18/2018  3:08 PM    HYDROmorphone (DILAUDID) injection 0.5 mg 0.5 mg Every 2 Hours PRN 7/17/2018 7/27/2018    Sig - Route: Infuse 0.5 mL into a venous catheter Every 2 (Two) Hours As Needed for Severe Pain . - Intravenous    Linked Group 1:  \"And\" Linked Group Details        Latanoprostene Bunod 0.024 % solution 1 drop 1 drop Nightly 7/17/2018     Sig - Route: Administer 1 drop to both eyes Every Night. - Both Eyes    lisinopril (PRINIVIL,ZESTRIL) tablet 20 mg 20 mg Daily 7/17/2018     Sig - Route: Take 2 tablets by mouth Daily. - Oral    Linked Group 2:  \"And\" Linked Group Details        Magnesium Sulfate 2 gram / 50mL Infusion (GIVE X 3 BAGS TO EQUAL 6GM TOTAL DOSE) - Mg 1.1 - 1/5 mg/dl 2 g As Needed 7/17/2018     Sig - Route: Infuse 50 mL into a venous catheter As Needed (See Administration Instructions). - Intravenous    Cosign for Ordering: Accepted by Shweta Ceja MD on 7/17/2018  6:23 PM    Linked Group 3:  \"Or\" Linked Group Details        Magnesium Sulfate 2 gram Bolus, followed by 8 gram infusion (total Mg dose 10 grams)- Mg less than or equal to 1mg/dL 2 g As Needed 7/17/2018     Sig - Route: Infuse 50 mL into a venous catheter As Needed (See Administration Instructions). - Intravenous    Cosign for Ordering: Accepted by Shweta Ceja MD on 7/17/2018  6:23 PM    Linked Group 3:  \"Or\" Linked Group Details        Magnesium Sulfate 4 gram infusion- Mg 1.6-1.9 mg/dL 4 g As Needed 7/17/2018     Sig - Route: Infuse 100 mL into a venous catheter As Needed (See Administration Instructions). - Intravenous    Cosign for Ordering: " "Accepted by Shweta Ceja MD on 7/17/2018  6:23 PM    Linked Group 3:  \"Or\" Linked Group Details        metoprolol tartrate (LOPRESSOR) injection 2.5 mg 2.5 mg Once 7/18/2018 7/18/2018    Sig - Route: Infuse 2.5 mL into a venous catheter 1 (One) Time. - Intravenous    naloxone (NARCAN) injection 0.1 mg 0.1 mg Every 5 Minutes PRN 7/17/2018     Sig - Route: Infuse 0.25 mL into a venous catheter Every 5 (Five) Minutes As Needed for Respiratory Depression. - Intravenous    Linked Group 1:  \"And\" Linked Group Details        nitroglycerin (NITROSTAT) SL tablet 0.4 mg 0.4 mg Every 5 Minutes PRN 7/17/2018     Sig - Route: Place 1 tablet under the tongue Every 5 (Five) Minutes As Needed for Chest Pain (if systolic BP greater than 100 mm/Hg.). - Sublingual    Cosign for Ordering: Accepted by Shweta Ceja MD on 7/17/2018  6:23 PM    oxyCODONE-acetaminophen (PERCOCET) 5-325 MG per tablet 1 tablet 1 tablet Every 4 Hours PRN 7/17/2018 7/27/2018    Sig - Route: Take 1 tablet by mouth Every 4 (Four) Hours As Needed for Severe Pain . - Oral    polyethylene glycol (MIRALAX) powder 17 g 17 g Daily 7/18/2018     Sig - Route: Take 17 g by mouth Daily. - Oral    Cosign for Ordering: Accepted by Shweta Ceja MD on 7/18/2018  3:08 PM    potassium & sodium phosphates (PHOS-NAK) 280-160-250 MG packet - for Phosphorus 1.25 - 2.1 mg/dL 2 packet Once As Needed 7/19/2018     Sig - Route: Take 2 packets by mouth 1 (One) Time As Needed (Phosphorus 1.25 - 2.1 mg/dL). - Oral    Cosign for Ordering: Required by Shweta Ceja MD    Linked Group 4:  \"Or\" Linked Group Details        potassium & sodium phosphates (PHOS-NAK) 280-160-250 MG packet - for Phosphorus less than 1.25 mg/dL 2 packet Every 6 Hours PRN 7/19/2018     Sig - Route: Take 2 packets by mouth Every 6 (Six) Hours As Needed (Phosphorus less than 1.25 mg/dL). - Oral    Cosign for Ordering: Required by Shweta Ceja MD    Linked Group 4:  \"Or\" Linked Group " Details        potassium chloride (K-DUR,KLOR-CON) CR tablet 40 mEq 40 mEq Every 4 Hours 7/18/2018 7/18/2018    Sig - Route: Take 2 tablets by mouth Every 4 (Four) Hours. - Oral    potassium chloride (KLOR-CON) packet 40 mEq 40 mEq As Needed 7/18/2018     Sig - Route: Take 40 mEq by mouth As Needed (potassium replacement, see admin instructions). - Oral    Cosign for Ordering: Accepted by Shweta Ceja MD on 7/18/2018  3:08 PM    potassium chloride (MICRO-K) CR capsule 40 mEq 40 mEq As Needed 7/18/2018     Sig - Route: Take 4 capsules by mouth As Needed (potassium replacement.  see admin instructions). - Oral    Cosign for Ordering: Accepted by Shweta Ceja MD on 7/18/2018  3:08 PM    sertraline (ZOLOFT) tablet 50 mg 50 mg Daily 7/17/2018     Sig - Route: Take 1 tablet by mouth Daily. - Oral    sodium chloride 0.9 % infusion 50 mL/hr Continuous 7/18/2018     Sig - Route: Infuse 50 mL/hr into a venous catheter Continuous. - Intravenous    Cosign for Ordering: Accepted by Shweta Ceja MD on 7/18/2018  3:08 PM            Lab Results (last 24 hours)     Procedure Component Value Units Date/Time    Osmolality, Calculated [[id number]]  (Abnormal) Collected:  07/19/18 0058    Specimen:  Blood Updated:  07/19/18 0328     Osmolality Calc 254.5 (L) mOsm/kg     Magnesium [[id number]]  (Normal) Collected:  07/19/18 0058    Specimen:  Blood Updated:  07/19/18 0322     Magnesium 2.1 mg/dL     Comprehensive Metabolic Panel [[id number]]  (Abnormal) Collected:  07/19/18 0058    Specimen:  Blood Updated:  07/19/18 0322     Glucose 109 mg/dL      BUN 9 mg/dL      Creatinine 0.55 mg/dL      Sodium 128 (L) mmol/L      Potassium 3.9 mmol/L      Chloride 97 (L) mmol/L      CO2 22.4 (L) mmol/L      Calcium 9.0 mg/dL      Total Protein 6.3 g/dL      Albumin 3.60 g/dL      ALT (SGPT) 40 (H) U/L      AST (SGOT) 47 (H) U/L      Alkaline Phosphatase 87 U/L      Total Bilirubin 0.9 mg/dL      eGFR Non  Amer 108  mL/min/1.73      Globulin 2.7 gm/dL      A/G Ratio 1.3 (L) g/dL      BUN/Creatinine Ratio 16.4     Anion Gap 8.6 mmol/L     Narrative:       The MDRD GFR formula is only valid for adults with stable renal function between ages 18 and 70.    Phosphorus [[id number]]  (Abnormal) Collected:  07/19/18 0058    Specimen:  Blood Updated:  07/19/18 0321     Phosphorus 2.3 (L) mg/dL     CBC & Differential [[id number]] Collected:  07/19/18 0058    Specimen:  Blood Updated:  07/19/18 0206    Narrative:       The following orders were created for panel order CBC & Differential.  Procedure                               Abnormality         Status                     ---------                               -----------         ------                     CBC Auto Differential[[id number]]        Abnormal            Final result                 Please view results for these tests on the individual orders.    CBC Auto Differential [[id number]]  (Abnormal) Collected:  07/19/18 0058    Specimen:  Blood Updated:  07/19/18 0206     WBC 11.18 10*3/mm3      RBC 3.73 (L) 10*6/mm3      Hemoglobin 11.5 (L) g/dL      Hematocrit 34.1 (L) %      MCV 91.4 fL      MCH 30.8 pg      MCHC 33.7 g/dL      RDW 13.2 %      RDW-SD 42.6 fl      MPV 10.3 (H) fL      Platelets 227 10*3/mm3      Neutrophil % 79.6 (H) %      Lymphocyte % 8.2 (L) %      Monocyte % 11.6 %      Eosinophil % 0.2 %      Basophil % 0.1 %      Immature Grans % 0.3 %      Neutrophils, Absolute 8.90 (H) 10*3/mm3      Lymphocytes, Absolute 0.92 (L) 10*3/mm3      Monocytes, Absolute 1.30 (H) 10*3/mm3      Eosinophils, Absolute 0.02 10*3/mm3      Basophils, Absolute 0.01 10*3/mm3      Immature Grans, Absolute 0.03 10*3/mm3     Potassium [[id number]]  (Normal) Collected:  07/18/18 1850    Specimen:  Blood Updated:  07/18/18 1930     Potassium 3.7 mmol/L         Imaging Results (last 24 hours)     ** No results found for the last 24 hours. **        Orders (last 24 hrs)     Start     Ordered     07/20/18 0000  lisinopril (PRINIVIL,ZESTRIL) 20 MG tablet  Daily      07/19/18 0935    07/19/18 1021  Discharge patient  Once      07/19/18 1022    07/19/18 0824  potassium & sodium phosphates (PHOS-NAK) 280-160-250 MG packet - for Phosphorus less than 1.25 mg/dL  Every 6 Hours PRN      07/19/18 0824    07/19/18 0824  potassium & sodium phosphates (PHOS-NAK) 280-160-250 MG packet - for Phosphorus 1.25 - 2.1 mg/dL  Once As Needed      07/19/18 0824    07/19/18 0600  CBC & Differential  Morning Draw      07/18/18 0859    07/19/18 0600  Comprehensive Metabolic Panel  Morning Draw      07/18/18 0859    07/19/18 0600  Magnesium  Morning Draw      07/18/18 0859    07/19/18 0600  Phosphorus  Morning Draw      07/18/18 0859    07/19/18 0600  CBC Auto Differential  PROCEDURE ONCE      07/19/18 0001    07/19/18 0222  Osmolality, Calculated  Once      07/19/18 0221    07/19/18 0000  oxyCODONE-acetaminophen (PERCOCET) 5-325 MG per tablet  Every 4 Hours PRN      07/19/18 0931    07/19/18 0000  docusate sodium (COLACE) 100 MG capsule  2 Times Daily PRN      07/19/18 0931    07/19/18 0000  Discharge Follow-up with PCP      07/19/18 0935    07/19/18 0000  apixaban (ELIQUIS) 2.5 MG tablet tablet  Every 12 Hours Scheduled      07/19/18 1022    07/19/18 0000  Ambulatory Referral to Home Health      07/19/18 1022    07/19/18 0000  Walker      07/19/18 1022    07/19/18 0000  Commode Chair      07/19/18 1022    07/19/18 0000  Discharge Follow-up with Specified Provider: Dr. Moreno      07/19/18 1022    07/18/18 1900  Potassium  Timed      07/18/18 1527    07/18/18 1900  metoprolol tartrate (LOPRESSOR) injection 2.5 mg  Once      07/18/18 1825    07/18/18 1600  Vital Signs  Every 8 Hours      07/17/18 1038    07/18/18 1600  Neurovascular Checks  Every 8 Hours      07/17/18 1038    07/18/18 1000  potassium chloride (K-DUR,KLOR-CON) CR tablet 40 mEq  Every 4 Hours      07/18/18 0850    07/18/18 0930  polyethylene glycol (MIRALAX) powder  17 g  Daily      07/18/18 0845    07/18/18 0930  docusate sodium (COLACE) capsule 100 mg  2 Times Daily      07/18/18 0845    07/18/18 0930  sodium chloride 0.9 % infusion  Continuous      07/18/18 0859    07/18/18 0900  apixaban (ELIQUIS) tablet 2.5 mg  Every 12 Hours Scheduled      07/17/18 1038    07/18/18 0845  potassium chloride (MICRO-K) CR capsule 40 mEq  As Needed      07/18/18 0845    07/18/18 0845  potassium chloride (KLOR-CON) packet 40 mEq  As Needed      07/18/18 0845    07/17/18 2100  Latanoprostene Bunod 0.024 % solution 1 drop  Nightly      07/17/18 1038    07/17/18 1900  Magnesium Sulfate 2 gram Bolus, followed by 8 gram infusion (total Mg dose 10 grams)- Mg less than or equal to 1mg/dL  As Needed      07/17/18 1801    07/17/18 1900  Magnesium Sulfate 2 gram / 50mL Infusion (GIVE X 3 BAGS TO EQUAL 6GM TOTAL DOSE) - Mg 1.1 - 1/5 mg/dl  As Needed      07/17/18 1801    07/17/18 1900  Magnesium Sulfate 4 gram infusion- Mg 1.6-1.9 mg/dL  As Needed      07/17/18 1801    07/17/18 1604  nitroglycerin (NITROSTAT) SL tablet 0.4 mg  Every 5 Minutes PRN      07/17/18 1604    07/17/18 1500  lisinopril (PRINIVIL,ZESTRIL) tablet 20 mg  Daily      07/17/18 1405    07/17/18 1400  sertraline (ZOLOFT) tablet 50 mg  Daily      07/17/18 1203    07/17/18 1032  HYDROmorphone (DILAUDID) injection 0.5 mg  Every 2 Hours PRN      07/17/18 1038    07/17/18 1032  naloxone (NARCAN) injection 0.1 mg  Every 5 Minutes PRN      07/17/18 1038    07/17/18 1031  oxyCODONE-acetaminophen (PERCOCET) 5-325 MG per tablet 1 tablet  Every 4 Hours PRN      07/17/18 1038    07/17/18 0730  bacitracin 150,000 Units, polymyxin B 1,500,000 Units in sodium chloride (NS) 3,000 mL irrigation  Once      07/17/18 0650    Unscheduled  Up in Chair x 1 day of surgery, then up in chair x 3 beginning POD #1.  As Needed      07/17/18 1038    Unscheduled  For same day discharge patients, up in chair x2 day of surgery.  As Needed      07/17/18 1038     Unscheduled  Ice to incision for 48 hours, then PRN for edema, after activity or exercise, and to lessen discomfort.  As Needed      07/17/18 1038    Unscheduled  Apply Ice to Incision PRN for Edema, After Activity or Exercise, and to Lessen Discomfort  As Needed      07/17/18 1038    Unscheduled  Change dressing  As Needed     Comments:  May d/c dressing changes when no drainage from wound.    07/17/18 1038    Unscheduled  ECG 12 Lead  As Needed     Comments:  Nurse to Release if Patient Expericences Acute Chest Pain or Dysrhythmias    07/17/18 1604    Unscheduled  Potassium  As Needed     Comments:  For Ventricular Arrhythmias      07/17/18 1604    Unscheduled  Magnesium  As Needed     Comments:  For Ventricular Arrhythmias      07/17/18 1604    Unscheduled  Troponin  As Needed     Comments:  For Chest Pain      07/17/18 1604    Unscheduled  Digoxin Level  As Needed     Comments:  For Atrial Arrhythmias      07/17/18 1604    Unscheduled  Blood Gas, Arterial  As Needed     Comments:  Per O2 PolicyNotify Physician      07/17/18 1604             Operative/Procedure Notes (most recent note)      Ignacio Moreno MD at 7/17/2018  7:00 AM        DATE OF SURGERY: 07/17/18    PREOPERATIVE DIAGNOSIS: 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  The left leg was placed in tourniquet grams of Ancef given immediately preoperatively and 1 g of TXA.  Left leg was sterilely prepped and draped in usual manner.  Midline incision was made medial parapatellar arthrotomy carried out the patella everted  the knee flexed both menisci excised.  Attached articular cartilage distal portion of the medial femoral condyle was removed.  Underlying bone was sclerotic.  With intramedullary guide position the distal femoral cut was made at 11 mm.  Intercondylar notch was osteotomized and a trial femoral component position found to be appropriate and removed.  External tibial alignment device was positioned and the proximal tibia cut made 6 mm off the medial side.  Trial spacers were positioned and the knee was balanced releasing medial side with Lacy elevator.  The extremity was then exsanguinated and the tourniquet inflated to 250 mmHg.  Proximal tibia was prepared for keel and broach with punch.  With trial tibial tray in place the femoral component was repositioned and 5 mm insert was found to be appropriate thickness.  Trial components were removed.  The medial femoral condyle small cystic area was curetted.  Bony surfaces were aggressively irrigated and sponged dry.  Cement was repaired both tibial and femoral components were precoated and then impacted into place removing excess cement and placing spacer holding knee in full extension.  Knee was taken through range of motion demonstrating full flexion full extension good medial lateral stability.  Again the joint was copiously irrigated removing excess cement posteriorly and then placing trial spacer which was appropriate.  It was removed and the actual implant was positioned.  Again the knee was irrigated with the the knee then closed in layers using running strata fixed for quadriceps tendon and patellar ligament and layered closure with 30 strata fixed for final closure.  Dermabond skin dressing was applied knee immobilizer was applied she was taken to recovery room in stable condition.  Implant components a 10 posterior stabilized femur size 3 rotating platform tibial base size 4 rotating platform posterior stabilized tibial insert size 3 x 5 mm.       Dictator  Signature:___________________________  Ignacio Moreno M.D.     CC Miki Muñoz M.D.              Electronically signed by Ignacio Moreno MD at 2018 10:28 AM          Physician Progress Notes (most recent note)      CURTIS Romano at 2018  8:22 AM                                    [facility] PRO[facility]ESS [facility]     Patient Identification:  Name:  Aydee Becker  Age:  73 y.o.  Sex:  female  :  1945  MRN:  [id number]  Visit Number:  [id number]  Primary Care Provider:  Miki Bay MD    Length of stay:  2    ----------------------------------------------------------------------------------------------------------------------  Subjective     Chief Complaint:  Left knee soreness     Subjective/Interval History:    Patient is a 72 yo female admitted per orthopedic surgery 2018 status post routine left total knee arthroplasty. Patient is POD #2. Today, the patient was resting in bedside chair per my evaluation this morning. She is being discharged home per primary (orthopedic surgery) with home health and PT. She continues to report soreness in her left knee, moderately controlled with current pain medication regimen. BP remains well controlled. No overnight complaints or acute complaints. No fevers or chills.  No new onset cough.  She denies any paresthesias.  She denies any abdominal pain, nausea, vomiting, or diarrhea.  She has has had BM post operatively. She denies any urinary symptoms.    Present during exam: Patient's daughter.   ----------------------------------------------------------------------------------------------------------------------  Objective     [facility] Meds:    apixaban 2.5 mg Oral Q12H   bacitracin + polymyxin in normal saline 3000 mL IRRIGATION  Irrigation Once   docusate sodium 100 mg Oral BID   Latanoprostene Bunod 1 drop Both Eyes Nightly   lisinopril 20 mg Oral Daily   polyethylene glycol 17 g Oral Daily    sertraline 50 mg Oral Daily       sodium chloride 50 mL/hr Last Rate: 50 mL/hr (07/19/18 0728)     ----------------------------------------------------------------------------------------------------------------------  Vital Signs:  Temp:  [97.6 °F (36.4 °C)-98.8 °F (37.1 °C)] 98.4 °F (36.9 °C)  Heart Rate:  [82-91] 82  Resp:  [16-19] 18  BP: (125-175)/(63-79) 138/76  No data found.    SpO2 Percentage    07/18/18 1830 07/18/18 2359 07/19/18 0030   SpO2: 95% 94% 96%     SpO2:  [94 %-96 %] 96 %  on   ;   Device (Oxygen Therapy): room air    Body mass index is 27.63 kg/m².  Wt Readings from Last 3 Encounters:   07/17/18 70.8 kg (156 lb)   07/11/18 70.8 kg (156 lb)   07/11/18 70.8 kg (156 lb)        Intake/Output Summary (Last 24 hours) at 07/19/18 0822  Last data filed at 07/19/18 0728   Gross per 24 hour   Intake          2451.67 ml   Output                0 ml   Net          2451.67 ml     Diet Regular  ----------------------------------------------------------------------------------------------------------------------  Physical exam:  Constitutional:  Well-developed and well-nourished.  No respiratory distress.  Pleasant. Sitting up in bedside chair.     HENT:  Head:  Normocephalic and atraumatic.  Mouth:  Moist mucous membranes.    Eyes:  Conjunctivae and EOM are normal.  Pupils are equal, round, and reactive to light.  No scleral icterus.    Neck:  Neck supple.  No JVD present.    Cardiovascular:  Normal rate, regular rhythm and normal heart sounds with no murmur.  Pulmonary/Chest:  No respiratory distress, no wheezes, no crackles, with normal breath sounds and good air movement.  Abdominal:  Soft. Bowel sounds are normal.  No distension and no tenderness.   Musculoskeletal:  Left lower extremity with knee immobilizer removed, dressing open to air. Incision clean dry and intact.   Bulky postoperative dressing in place. Mild bleeding at proximal wound edge.  Moderate swelling noted without calf tenderness.  She  has good range of motion at the ankle joint.  Right lower extremity unremarkable.  Neurovascularly intact bilaterally.    Neurological:  Alert and oriented to person, place, and time.  No cranial nerve deficit.  No tongue deviation.  No facial droop.  No slurred speech.   Skin:  Skin is warm and dry. No rash noted. No pallor.   Peripheral vascular:  Strong pulses in all 4 extremities with no clubbing, no cyanosis, no edema. Cap refill < 3 seconds.   Genitourinary: No monae catheter in place, making urine.   ----------------------------------------------------------------------------------------------------------------------  Tele:    Sinus 80s    I have personally reviewed the EKG/Telemetry strips   ----------------------------------------------------------------------------------------------------------------------              Results from last 7 days  Lab Units 07/19/18  0058 07/18/18  0039 07/17/18  1618   WBC 10*3/mm3 11.18 11.11 12.40   HEMOGLOBIN g/dL 11.5* 12.1 12.3   HEMATOCRIT % 34.1* 35.8* 36.6*   MCV fL 91.4 91.8 91.0   MCHC g/dL 33.7 33.8 33.6   PLATELETS 10*3/mm3 227 229 213       Results from last 7 days  Lab Units 07/19/18  0058 07/18/18  1850 07/18/18  0039 07/17/18  1618   SODIUM mmol/L 128*  --  130* 134*   POTASSIUM mmol/L 3.9 3.7 3.2* 3.5   MAGNESIUM mg/dL 2.1  --  3.1* 1.7   CHLORIDE mmol/L 97*  --  96* 101   CO2 mmol/L 22.4*  --  24.8 26.2   BUN mg/dL 9  --  8 9   CREATININE mg/dL 0.55  --  0.64 0.68   EGFR IF NONAFRICN AM mL/min/1.73 108  --  91 85   CALCIUM mg/dL 9.0  --  8.9 9.1   GLUCOSE mg/dL 109  --  135* 142*   ALBUMIN g/dL 3.60  --  3.70 3.80   BILIRUBIN mg/dL 0.9  --  0.7 0.5   ALK PHOS U/L 87  --  76 79   AST (SGOT) U/L 47*  --  19 16   ALT (SGPT) U/L 40*  --  15 14   Estimated Creatinine Clearance: 58.2 mL/min (by C-G formula based on SCr of 0.55 mg/dL).    Hemoglobin A1C   Date/Time Value Ref Range Status   07/17/2018 1618 5.50 4.50 - 5.70 % Final     Lab Results   Component Value  Date    TSH 2.871 07/17/2018     I have personally reviewed the above laboratory results.   ----------------------------------------------------------------------------------------------------------------------  Imaging Results (last 24 hours)     Procedure Component Value Units Date/Time    XR Knee 1 or 2 View Left [[id number]] Collected:  07/17/18 1119     Updated:  07/17/18 1121    Narrative:       FINDINGS:   Patient is status post total left knee arthroplasty. Anatomic alignment.  No hardware complication.        Impression:       As above.     This report was finalized on 7/17/2018 11:19 AM by Dr. Malcolm Pompa MD.      I have personally reviewed the above radiology results.     Transthoracic Echocardiogram 6/26/2018    ----------------------------------------------------------------------------------------------------------------------  Assessment/Plan     -Left knee osteoarthritis with avascular necrosis status post left total knee arthroplasty POD # 2  -Essential hypertension, controlled   -Grade I diastolic dysfunction   -Mild pulmonary hypertension   -Mild hyperglycemia without history of diabetes mellitus, HgbA1C 5.50   -History of 1st degree AVB, NSR currently   -Hypomagnesemia, resolved with supplementation     -Acute hypokalemia, resolved with supplementation  -Hypophosphatemia    -Hyponatremia   -Glaucoma   -Depression     Continue post operative care and pain management per primary (orthopedic surgery). Continue PT per protocol per ortho recommendations. Holding parameters on pain medications and BP medications to prevent hypotension. BP remains well controlled, continue current BP regimen. Patient is being discharged home today with home health per primary. I have held Lisinopril/HCTZ on discharge, and prescribed the patient Lisinopril only. I have requested follow up with PCP in one week post discharge for repeat CMP due to electrolyte abnormalities. Patient's sodium slightly lower today,  encouraged patient on adequate oral fluid intake. She reports she has not been eating or drinking as she usually does at home. Patient verbalized understanding of instructions. Continue to encourage incentive spirometry, one will be sent home with patient on discharge. Recommend continuing stool softeners on discharge with narcotic analgesia to prevent post operative constipation and sequelae.       DVT Prophylaxis: Eliquis per orthopedic surgery     The patient is high risk due to the following diagnoses/reasons:  S/P TKA, HTN, diastolic dysfunction, mild PaHTN, history of abnormal EKG    I have discussed the patient's assessment and plan with the patient.     Disposition: Today per primary (ortho surgery) with home health and physical therapy     Please CC: MD Renea Hylton PA  18  8:22 AM  ----------------------------------------------------------------------------------------------------------------------        Electronically signed by CURTIS Romano at 2018  9:45 AM          Consult Notes (most recent note)      Shweta Ceja MD at 2018  3:51 PM      Consult Orders:    1. Inpatient Consult to Hospitalist [[id number]] ordered by Kingston Palmer MD at 18 [address] [place] [address] HOSPITALIST CONSULT NOTE     Inpatient Hospitalist Consult  Consult performed by: SHWETA CEJA  Consult ordered by: KINGSTON PALMER          Patient Identification:  Name:  Aydee Becker  Age:  73 y.o.  Sex:  female  :  1945  MRN:  [id number]  Visit Number:  [id number]  Primary care provider:  Miki Bay MD    Length of stay:  0    Subjective     Chief Complaint:  Left knee pain     History of presenting illness:     Patient is a 74 yo female admitted per orthopedic surgery earlier today for avascular necrosis medial femoral condyle left knee and underwent routine left total knee arthroplasty.  Hospitalist services were consulted for medical management. Patient's past medical history is significant for essential hypertension, grade I diastolic dysfunction, mild pulmonary hypertension, glaucoma, and depression.     Patient tolerated procedure well earlier today. She has already been up to bedside chair and worked with physical therapy today. She reports moderate pain in her left knee, reports current pain medication regimen does slightly improve her discomfort. Patient states that prior to surgery she has been in good health. She was diagnosed with a murmur and was seen by cardiology prior to surgery for cardiac clearance. ECHO revealed mild Pul HTN and grade I diastolic dysfunction. EKG had revealed 1st degree AVB. She was cleared for surgery by Dr. Lao with cardiology. She has had no further cardiac issues. Patient states that her blood pressure is typically controlled with lisinopril/HCTZ combo pill. She is not diabetic. She has no complaints other than left knee pain at this time. She denies any chest pain or shortness of breath. No cough. She denies any abdominal pain, nausea, vomiting, or diarrhea. She denies any urinary symptoms. No fevers or chills. Denies any paresthesias.     Present during exam: Patient's multiple family members  ---------------------------------------------------------------------------------------------------------------------  Review of Systems   Constitutional: Negative for activity change, appetite change, chills, diaphoresis, fatigue and fever.   HENT: Negative for congestion, postnasal drip, rhinorrhea, sinus pain, sore throat and trouble swallowing.    Eyes: Negative for discharge and visual disturbance.   Respiratory: Negative for cough, chest tightness, shortness of breath and wheezing.    Cardiovascular: Negative for chest pain, palpitations and leg swelling.   Gastrointestinal: Negative for abdominal pain, constipation, diarrhea, nausea and vomiting.   Endocrine:  Negative for cold intolerance and heat intolerance.   Genitourinary: Negative for decreased urine volume, dysuria, frequency and urgency.   Musculoskeletal: Negative for arthralgias, gait problem and myalgias.        Left knee pain post operatively      Skin: Negative for color change, rash and wound.   Allergic/Immunologic: Negative for environmental allergies and immunocompromised state.   Neurological: Negative for dizziness, syncope, weakness, light-headedness and headaches.   Hematological: Negative for adenopathy. Does not bruise/bleed easily.   Psychiatric/Behavioral: Negative for confusion and decreased concentration. The patient is not nervous/anxious.       ---------------------------------------------------------------------------------------------------------------------   Past History:  Past Medical History:   Diagnosis Date   • Arthritis    • Depression    • Diastolic dysfunction    • Glaucoma    • Heart murmur    • Hypertension    • Knee pain, left    • Mild pulmonary hypertension      Past Surgical History:   Procedure Laterality Date   • CATARACT EXTRACTION Left     with stent placement for glaucoma     Family History   Problem Relation Age of Onset   • Rheum arthritis Mother    • Hypertension Mother    • Rheum arthritis Father    • Gout Sister    • Gout Brother      Social History     Social History   • Marital status:      Social History Main Topics   • Smoking status: Never Smoker   • Smokeless tobacco: Never Used   • Alcohol use No   • Drug use: No   • Sexual activity: Defer     Other Topics Concern   • Not on file     ---------------------------------------------------------------------------------------------------------------------   Allergies:  Patient has no known allergies.  ---------------------------------------------------------------------------------------------------------------------   Prior to Admission Medications     Prescriptions Last Dose Informant Patient Reported?  Taking?    HYDROcodone-acetaminophen (NORCO) 7.5-325 MG per tablet 7/16/2018 Self No Yes    Take 1 tablet by mouth Every 6 (Six) Hours As Needed for Moderate Pain .    Patient taking differently:  Take 0.5 tablets by mouth 2 (Two) Times a Day As Needed for Moderate Pain .    lisinopril-hydrochlorothiazide (PRINZIDE,ZESTORETIC) 20-12.5 MG per tablet 7/16/2018 Self Yes Yes    Take 1 tablet by mouth Daily.    sertraline (ZOLOFT) 50 MG tablet 7/16/2018 Self Yes Yes    Take 50 mg by mouth Daily.    Latanoprostene Bunod (VYZULTA) 0.024 % solution 7/15/2018 Self Yes No    Administer 1 drop to both eyes Every Night.        ---------------------------------------------------------------------------------------------------------------------     Objective      [facility] Meds:    [START ON 7/18/2018] apixaban 2.5 mg Oral Q12H   bacitracin + polymyxin in normal saline 3000 mL IRRIGATION  Irrigation Once   ceFAZolin 2 g Intravenous Q8H   lisinopril 20 mg Oral Daily   And      Hydrochlorothiazide Oral 12.5 mg Oral Daily   Latanoprostene Bunod 1 drop Both Eyes Nightly   sertraline 50 mg Oral Daily     ---------------------------------------------------------------------------------------------------------------------   Vital Signs:  Temp:  [97.4 °F (36.3 °C)-98.5 °F (36.9 °C)] 97.5 °F (36.4 °C)  Heart Rate:  [73-94] 85  Resp:  [12-20] 18  BP: (136-169)/(71-94) 165/73  No data found.    SpO2 Percentage    07/17/18 1508 07/17/18 1530 07/17/18 1600   SpO2: 94% 94% 93%     SpO2:  [93 %-100 %] 93 %  on  Flow (L/min):  [2-4] 2;   Device (Oxygen Therapy): room air    Body mass index is 27.63 kg/m².  Wt Readings from Last 3 Encounters:   07/17/18 70.8 kg (156 lb)   07/11/18 70.8 kg (156 lb)   07/11/18 70.8 kg (156 lb)        Intake/Output Summary (Last 24 hours) at 07/17/18 1710  Last data filed at 07/17/18 1019   Gross per 24 hour   Intake             1500 ml   Output                0 ml   Net             1500 ml     Diet  Regular  ---------------------------------------------------------------------------------------------------------------------   Physical exam:  Constitutional:  Well-developed and well-nourished.  No respiratory distress. Pleasant.     HENT:  Head: Normocephalic and atraumatic.  Mouth:  Moist mucous membranes.    Eyes:  Conjunctivae and EOM are normal.  Pupils are equal, round, and reactive to light.  No scleral icterus.    Neck:  Neck supple.  No JVD present.    Cardiovascular:  Regular rate and rhythm.  S1 + S2 + systolic murmur  Pulmonary/Chest:  No respiratory distress, no wheezes, no crackles, with normal breath sounds and good air movement.  Abdominal:  Soft.  Bowel sounds are normal.  No distension and no tenderness.   Musculoskeletal:  LLE in knee immobilizer brace, surgical wound not visualized today. RLE unremarkable. NVI bilaterally.     Neurological:  Alert and oriented to person, place, and time.  No cranial nerve deficit.  No tongue deviation.  No facial droop.  No slurred speech.   Skin:  Skin is warm and dry. No rash noted.  No pallor.   Peripheral vascular: No edema. Capillary refill < 3 seconds.   Psychiatric:  Normal mood and affect.  Behavior is normal.  Judgment and thought content normal.   Genitourinary: No monae catheter in place, making urine.   ---------------------------------------------------------------------------------------------------------------------   EKG:       Telemetry:  Telemetry monitoring not in place at this time, will review once monitoring available    I have personally reviewed the EKG/Telemetry strips.   ---------------------------------------------------------------------------------------------------------------------               Results from last 7 days  Lab Units 07/17/18  1618 07/11/18  0839   WBC 10*3/mm3 12.40 5.93   HEMOGLOBIN g/dL 12.3 13.8   HEMATOCRIT % 36.6* 41.7   MCV fL 91.0 91.6   MCHC g/dL 33.6 33.1   PLATELETS 10*3/mm3 213 261       Results from  last 7 days  Lab Units 07/17/18  1618 07/11/18  0839   SODIUM mmol/L 134* 136   POTASSIUM mmol/L 3.5 3.7   MAGNESIUM mg/dL 1.7  --    CHLORIDE mmol/L 101 101   CO2 mmol/L 26.2 27.2   BUN mg/dL 9 15   CREATININE mg/dL 0.68 0.77   EGFR IF NONAFRICN AM mL/min/1.73 85 73   CALCIUM mg/dL 9.1 9.7   GLUCOSE mg/dL 142* 79   ALBUMIN g/dL 3.80  --    BILIRUBIN mg/dL 0.5  --    ALK PHOS U/L 79  --    AST (SGOT) U/L 16  --    ALT (SGPT) U/L 14  --    Estimated Creatinine Clearance: 58.2 mL/min (by C-G formula based on SCr of 0.68 mg/dL).  No results found for: AMMONIA    No results found for: HGBA1C, POCGLU  No results found for: HGBA1C  Lab Results   Component Value Date    TSH 2.871 07/17/2018     I have personally reviewed the above laboratory results.   ---------------------------------------------------------------------------------------------------------------------  Imaging Results (last 7 days)     Procedure Component Value Units Date/Time    XR Knee 1 or 2 View Left [[id number]] Collected:  07/17/18 1119     Updated:  07/17/18 1121    Narrative:       FINDINGS:   Patient is status post total left knee arthroplasty. Anatomic alignment.  No hardware complication.        Impression:       As above.     This report was finalized on 7/17/2018 11:19 AM by Dr. Malcolm Pompa MD.      I have personally reviewed the above radiology results.     Transthoracic Echocardiogram 6/26/2018    ----------------------------------------------------------------------------------------------------------------------    Assessment/Plan     -Left knee osteoarthritis with avascular necrosis status post left total knee arthroplasty POD # 0  -Essential hypertension, controlled   -Grade I diastolic dysfunction   -Mild pulmonary hypertension   -Mild hyperglycemia without history of diabetes mellitus   -History of 1st degree AVB, NSR currently   -Glaucoma   -Depression     Continue post operative care and pain management per primary (orthopedic  surgery). PT per ortho recommendations. STAT labs and post operative EKG ordered and reviewed.  Encourage incentive spirometry.    As pateint has grade I diastolic dysfunction and SBP is stable at 155, will stop IV fluids. Patient tolerating diet. Continue to closely monitor for signs and symptoms of volume overload. Continue patient's home blood pressure medications with appropriate holding parameters, avoid hypotension with administration of narcotic analgesia and BP medication. Place patient on telemetry monitoring.     Mild hyperglycemia noted without history of DM. Will obtain HgbA1C to evaluate glycemic control, hold off on SSI/accuchecks for now. Will also obtain baseline TSH.     Continue to closely monitor electrolytes and replace per protocol as necessary. Repeat labs in AM and continue to closely monitor the patient.       DVT Prophylaxis: Eliquis per orthopedic surgery     Disposition: Per primary, will need home health services upon discharge.  on the case.     CODE STATUS: FULL CODE    Thank you for the consult, Hospitalist Services will continue to follow.     CURTIS Romano  18  5:10 PM  ---------------------------------------------------------------------------------------------------------------------   I have seen and examined the patient. I agree with the assessment and plan of Renea ACEVEDO. I have amended the above note to reflect my findings in history, physical examination, decision making and management plan.       Electronically signed by Shweta Ceja MD at 2018  7:04 PM       Nutrition Notes (most recent note)     No notes of this type exist for this encounter.           Physical Therapy Notes (most recent note)      Diana Moses PT at 2018  4:12 PM  Version 1 of 1         Acute Care - Physical Therapy Treatment Note  MICKEY Garcia     Patient Name: Aydee Becker  : 1945  MRN: [id number]  Today's Date: 2018  Onset of  Illness/Injury or Date of Surgery: 07/17/18 (admit date)  Date of Referral to PT: 07/17/18  Referring Physician: Tiffanie    Admit Date: 7/17/2018    Visit Dx:    ICD-10-CM ICD-9-CM   1. Primary osteoarthritis of left knee M17.12 715.16     Patient Active Problem List   Diagnosis   • Avascular necrosis of medial femoral condyle, left (CMS/HCC)   • Hypertension   • Glaucoma   • Primary osteoarthritis of left knee   • Murmur   • Abnormal ECG   • Preop cardiovascular exam       Therapy Treatment          Rehabilitation Treatment Summary     Row Name 07/18/18 1604             Treatment Time/Intention    Discipline physical therapist  -CT      Document Type therapy note (daily note)   BID  -CT      Subjective Information no complaints  -CT      Mode of Treatment individual therapy;physical therapy  -CT      Therapy Frequency (PT Clinical Impression) 2 times/day  -CT      Patient Effort excellent  -CT      Existing Precautions/Restrictions fall;weight bearing   WBAT LLE  -CT      Patient Response to Treatment Pt tolerated BID treatment sessions well with rest breaks provided as needed.   -CT      Recorded by [CT] Diana Moses, PT 07/18/18 1608      Row Name 07/18/18 1605             Cognitive Assessment/Intervention- PT/OT    Orientation Status (Cognition) oriented x 4  -CT      Follows Commands (Cognition) follows multi-step commands  -CT      Recorded by [CT] Diana Moses, PT 07/18/18 1608      Row Name 07/18/18 1600             Bed Mobility Assessment/Treatment    Bed Mobility Assessment/Treatment bed mobility (all) activities  -CT      [place] Level (Bed Mobility) moderate assist (50% patient effort)  -CT      Assistive Device (Bed Mobility) bed rails  -CT      Recorded by [CT] Diana Moses, PT 07/18/18 1608      Row Name 07/18/18 1602             Transfer Assessment/Treatment    Transfer Assessment/Treatment sit-stand transfer;stand-sit transfer  -CT      Recorded by [CT] Diana Moses, PT 07/18/18 1609       Row Name 07/18/18 1604             Sit-Stand Transfer    Sit-Stand [place] (Transfers) moderate assist (50% patient effort)  -CT      Assistive Device (Sit-Stand Transfers) walker, front-wheeled  -CT      Recorded by [CT] Diana Josué, PT 07/18/18 1608      Row Name 07/18/18 1604             Stand-Sit Transfer    Stand-Sit [place] (Transfers) moderate assist (50% patient effort)  -CT      Assistive Device (Stand-Sit Transfers) walker, front-wheeled  -CT      Recorded by [CT] Diana Moses, PT 07/18/18 1608      Row Name 07/18/18 1604             Gait/Stairs Assessment/Training    [place] Level (Gait) moderate assist (50% patient effort);verbal cues;nonverbal cues (demo/gesture)  -CT      Assistive Device (Gait) walker, front-wheeled  -CT      Distance in Feet (Gait) 8 ft in AM; 9 ft x 2 in PM  -CT      Pattern (Gait) step-to  -CT      Deviations/Abnormal Patterns (Gait) antalgic  -CT      Bilateral Gait Deviations forward flexed posture  -CT      Recorded by [CT] Diana Moses, PT 07/18/18 1608      Row Name 07/18/18 1604             Positioning and Restraints    Pre-Treatment Position in bed   in AM and PM  -CT      Post Treatment Position bed   in AM and PM  -CT      In Bed supine;call light within reach;encouraged to call for assist;notified nsg  -CT      Recorded by [CT] Diana Josué, PT 07/18/18 1608      Row Name 07/18/18 1604             Pain Scale: Numbers Pre/Post-Treatment    Pain Scale: Numbers, Pretreatment 7/10  -CT      Pain Scale: Numbers, Post-Treatment 8/10  -CT      Pain Location - Side Left  -CT      Pain Location knee  -CT      Recorded by [CT] Diana Moses, PT 07/18/18 1608      Row Name 07/18/18 1604             Respiratory WDL    Respiratory WDL WDL  -CT      Recorded by [CT] Diana Moses, PT 07/18/18 1608      Row Name 07/18/18 1604             Breath Sounds    Breath Sounds All Fields  -CT      All Lung Fields Breath Sounds clear;equal bilaterally  -CT       Recorded by [CT] Diana Moses, PT 07/18/18 1608      Row Name 07/18/18 1604             Skin WDL    Skin WDL --  -CT      Skin Integrity --  -CT      Recorded by [CT] Diana Moses, PT 07/18/18 1608      Row Name 07/18/18 1604             Wound 07/17/18 1015 Left knee incision    Wound - Properties Group Date first assessed: 07/17/18 [WM] Time first assessed: 1015 [WM] Side: Left [WM] Location: knee [WM] Type: incision [WM] Recorded by:  [WM] Baudilio Garrido RN 07/17/18 1015    Dressing Appearance --  -CT      Closure --  -CT      Recorded by [CT] Diana Moses, PT 07/18/18 1608      Row Name 07/18/18 1604             Coping    Observed Emotional State --  -CT      Recorded by [CT] Diana Moses, PT 07/18/18 1608      Row Name 07/18/18 1604             Plan of Care Review    Plan of Care Reviewed With patient  -CT      Recorded by [CT] Diana Moses, PT 07/18/18 1608      Row Name 07/18/18 1604             Outcome Summary/Treatment Plan (PT)    Daily Summary of Progress (PT) progress toward functional goals is good  -CT      Anticipated Equipment Needs at Discharge (PT) --  -CT      Anticipated Discharge Disposition (PT) --  -CT      Recorded by [CT] Diana Moses, PT 07/18/18 1608        User Key  (r) = Recorded By, (t) = Taken By, (c) = Cosigned By    Initials Name Effective Dates Discipline     Baudilio Garrido RN 06/16/16 -  Nurse    CT Dianatoni Crouchell, PT 04/03/18 -  PT          Wound 07/17/18 1015 Left knee incision (Active)   Dressing Appearance dry;intact 7/18/2018  8:00 AM   Closure STEFANY 7/18/2018  8:00 AM   Care, Wound other (see comments) 7/18/2018  8:00 AM             Physical Therapy Education     Title: PT OT SLP Therapies (Done)     Topic: Physical Therapy (Done)     Point: Mobility training (Done)    Learning Progress Summary     Learner Status Readiness Method Response Comment Documented by    Patient Done Acceptance E,TB VU  CT 07/18/18 1608     Done Acceptance E,TB VU  DM 07/17/18 1820      Done Acceptance E,TB NR,VU  CT 07/17/18 1526          Point: Home exercise program (Done)    Learning Progress Summary     Learner Status Readiness Method Response Comment Documented by    Patient Done Acceptance E,TB VU  CT 07/18/18 1608     Done Acceptance E,TB VU  DM 07/17/18 1829     Done Acceptance E,TB NR,VU  CT 07/17/18 1526          Point: Body mechanics (Done)    Learning Progress Summary     Learner Status Readiness Method Response Comment Documented by    Patient Done Acceptance E,TB VU  CT 07/18/18 1608     Done Acceptance E,TB VU  DM 07/17/18 1829     Done Acceptance E,TB NR,VU  CT 07/17/18 1526          Point: Precautions (Done)    Learning Progress Summary     Learner Status Readiness Method Response Comment Documented by    Patient Done Acceptance E,TB VU  CT 07/18/18 1608     Done Acceptance E,TB VU  DM 07/17/18 1829     Done Acceptance E,TB NR,VU  CT 07/17/18 1526                      User Key     Initials Effective Dates Name Provider Type Discipline    DM 06/16/16 -  Adeola Jennings, RN Registered Nurse Nurse    CT 04/03/18 -  Diana Moses, BARBARA Physical Therapist PT                    PT Recommendation and Plan  Anticipated Discharge Disposition (PT): home with home health, home with 24/7 care  Planned Therapy Interventions (PT Eval): balance training, bed mobility training, gait training, home exercise program, manual therapy techniques, motor coordination training, neuromuscular re-education, patient/family education, postural re-education, stair training, strengthening, transfer training  Therapy Frequency (PT Clinical Impression): 2 times/day  Outcome Summary/Treatment Plan (PT)  Daily Summary of Progress (PT): progress toward functional goals is good  Anticipated Equipment Needs at Discharge (PT): front wheeled walker  Anticipated Discharge Disposition (PT): home with home health, home with 24/7 care  Plan of Care Reviewed With: patient  Progress: improving          Outcome Measures      Row Name 07/18/18 1600 07/17/18 1500          How much help from another person do you currently need...    Turning from your back to your side while in flat bed without using bedrails? 3  -CT 3  -CT     Moving from lying on back to sitting on the side of a flat bed without bedrails? 2  -CT 1  -CT     Moving to and from a bed to a chair (including a wheelchair)? 2  -CT 1  -CT     Standing up from a chair using your arms (e.g., wheelchair, bedside chair)? 2  -CT 1  -CT     Climbing 3-5 steps with a railing? 2  -CT 1  -CT     To walk in hospital room? 3  -CT 2  -CT     AM-PAC 6 Clicks Score 14  -CT 9  -CT        Functional Assessment    Outcome Measure Options AM-PAC 6 Clicks Basic Mobility (PT)  -CT AM-PAC 6 Clicks Basic Mobility (PT)  -CT       User Key  (r) = Recorded By, (t) = Taken By, (c) = Cosigned By    Initials Name Provider Type    CT Diana Moses PT Physical Therapist           Time Calculation:         PT Charges     Row Name 07/18/18 1611 07/18/18 1609          Time Calculation    PT Received On 07/18/18  -CT 07/18/18  -CT     PT - Next Appointment 07/19/18  -CT 07/19/18  -CT     PT Goal Re-Cert Due Date 07/31/18  -CT 07/31/18  -CT        Time Calculation- PT    Total Timed Code Minutes- PT 40 minute(s)   PM  -CT 31 minute(s)   AM  -CT        Timed Charges    48759 - Gait Training Minutes  20  -CT 15  -CT     58700 - PT Therapeutic Activity Minutes 20  -CT 16  -CT       User Key  (r) = Recorded By, (t) = Taken By, (c) = Cosigned By    Initials Name Provider Type    CT Diana Moses PT Physical Therapist          Therapy Charges for Today     Code Description Service Date Service Provider Modifiers Qty    77212961627 HC PT MOBILITY CURRENT 7/17/2018 Diana Moses PT GP, CL 1    98873669290 HC PT MOBILITY PROJECTED 7/17/2018 Diana Moses PT GP, CK 1    36889630963 HC PT EVAL HIGH COMPLEXITY 2 7/17/2018 Diana Moses PT GP 1    99480006959 HC GAIT TRAINING EA 15 MIN 7/17/2018 Diana Moses PT  GP 1    81067175742 HC PT THERAPEUTIC ACT EA 15 MIN 7/17/2018 Diana Josué, PT GP 1    60238441515 HC PT THER SUPP EA 15 MIN 7/17/2018 Diana Josué, PT GP 4    78691667644 HC GAIT TRAINING EA 15 MIN 7/18/2018 Diana Josué, PT GP 1    31836771981 HC PT THERAPEUTIC ACT EA 15 MIN 7/18/2018 Diana Josué, PT GP 1    47327839747 HC PT THER SUPP EA 15 MIN 7/18/2018 Diana Josué, PT GP 2    14797568687 HC GAIT TRAINING EA 15 MIN 7/18/2018 Diana Josué, PT GP 1    17744945652 HC PT THERAPEUTIC ACT EA 15 MIN 7/18/2018 Diana Josué, PT GP 2    65922339164 HC PT THER SUPP EA 15 MIN 7/18/2018 Diana Moses, PT GP 3          PT G-Codes  Outcome Measure Options: AM-PAC 6 Clicks Basic Mobility (PT)  Score: 9  Functional Limitation: Mobility: Walking and moving around  Mobility: Walking and Moving Around Current Status (): At least 60 percent but less than 80 percent impaired, limited or restricted  Mobility: Walking and Moving Around Goal Status (): At least 40 percent but less than 60 percent impaired, limited or restricted    Diana Moses, PT  7/18/2018         Electronically signed by Diana Moses PT at 7/18/2018  4:12 PM       Occupational Therapy Notes (most recent note)     No notes of this type exist for this encounter.        Speech Language Pathology Notes (most recent note)     No notes of this type exist for this encounter.        Respiratory Therapy Notes (most recent note)     No notes of this type exist for this encounter.        ADL Documentation (most recent)      Most Recent Value   Presence of Pain  non-verbal indicators absent   Transferring  3 - assistive equipment and person   Toileting  3 - assistive equipment and person   Bathing  2 - assistive person   Dressing  2 - assistive person   Eating  0 - independent   Communication  0 - understands/communicates without difficulty   Swallowing  0 - swallows foods/liquids without difficulty   Equipment Currently Used at Home  walker,  rolling, commode [Pt has a rolling walker and bedside commode from borrowed. ]            Discharge Summary     No notes of this type exist for this encounter.        Discharge Order     Start     Ordered    07/19/18 1021  Discharge patient  Once     Expected Discharge Date:  07/19/18    Discharge Disposition:  Home or Self Care    Physician of Record for Attribution - Please select from Treatment Team:  KINGSTON PALMER [[id number]]    Review needed by CMO to determine Physician of Record:  No       Question Answer Comment   Physician of Record for Attribution - Please select from Treatment Team KINGSTON PALMER    Review needed by CMO to determine Physician of Record No        07/19/18 1022

## 2018-07-19 NOTE — THERAPY TREATMENT NOTE
Acute Care - Physical Therapy Treatment Note   [place]     Patient Name: Aydee Becker  : 1945  MRN: [id number]  Today's Date: 2018  Onset of Illness/Injury or Date of Surgery: 18 (admit date)  Date of Referral to PT: 18  Referring Physician: Tiffanie    Admit Date: 2018    Visit Dx:    ICD-10-CM ICD-9-CM   1. Avascular necrosis of medial femoral condyle, left (CMS/HCC) M87.052 733.43   2. Primary osteoarthritis of left knee M17.12 715.16     Patient Active Problem List   Diagnosis   • Avascular necrosis of medial femoral condyle, left (CMS/HCC)   • Hypertension   • Glaucoma   • Primary osteoarthritis of left knee   • Murmur   • Abnormal ECG   • Preop cardiovascular exam       Therapy Treatment          Rehabilitation Treatment Summary     Row Name 18 1350             Treatment Time/Intention    Discipline physical therapist  -CT      Document Type therapy note (daily note)  -CT      Subjective Information complains of;pain  -CT      Mode of Treatment individual therapy;physical therapy  -CT      Therapy Frequency (PT Clinical Impression) 2 times/day  -CT      Patient Effort excellent  -CT      Existing Precautions/Restrictions fall;weight bearing   WBAT LLE  -CT      Patient Response to Treatment Pt tolerated treatment session well with rest breaks provided as needed. Pt discharged shortly after AM treatment session.   -CT      Recorded by [CT] Diana Moses, PT 18 1400      Row Name 18 135             Cognitive Assessment/Intervention- PT/OT    Orientation Status (Cognition) oriented x 4  -CT      Follows Commands (Cognition) follows multi-step commands  -CT      Recorded by [CT] Diana Moses, PT 18 1400      Row Name 18 8790             Bed Mobility Assessment/Treatment    Bed Mobility Assessment/Treatment bed mobility (all) activities  -CT      [place] Level (Bed Mobility) moderate assist (50% patient effort)  -CT      Assistive Device (Bed  Mobility) bed rails  -CT      Recorded by [CT] Diana Moses, PT 07/19/18 1400      Row Name 07/19/18 1358             Transfer Assessment/Treatment    Transfer Assessment/Treatment sit-stand transfer;stand-sit transfer  -CT      Recorded by [CT] Diana Moses, PT 07/19/18 1400      Row Name 07/19/18 1357             Sit-Stand Transfer    Sit-Stand [place] (Transfers) minimum assist (75% patient effort)  -CT      Assistive Device (Sit-Stand Transfers) walker, front-wheeled  -CT      Recorded by [CT] Diana Moses, PT 07/19/18 1400      Row Name 07/19/18 1357             Stand-Sit Transfer    Stand-Sit [place] (Transfers) minimum assist (75% patient effort)  -CT      Assistive Device (Stand-Sit Transfers) walker, front-wheeled  -CT      Recorded by [CT] Diana Moses, PT 07/19/18 1400      Row Name 07/19/18 1357             Gait/Stairs Assessment/Training    [place] Level (Gait) minimum assist (75% patient effort);verbal cues;nonverbal cues (demo/gesture)  -CT      Assistive Device (Gait) walker, front-wheeled  -CT      Distance in Feet (Gait) 150  -CT      Pattern (Gait) step-to  -CT      Deviations/Abnormal Patterns (Gait) antalgic  -CT      Bilateral Gait Deviations forward flexed posture  -CT      Recorded by [CT] Diana Moses, PT 07/19/18 1400      Row Name 07/19/18 6084             Positioning and Restraints    Pre-Treatment Position sitting in chair/recliner  -CT      Post Treatment Position chair  -CT      In Chair sitting;call light within reach;encouraged to call for assist;notified nsg  -CT      Recorded by [CT] Diana Moses, PT 07/19/18 1400      Row Name 07/19/18 1175             Pain Scale: Numbers Pre/Post-Treatment    Pain Scale: Numbers, Pretreatment 5/10  -CT      Pain Scale: Numbers, Post-Treatment 6/10  -CT      Pain Location - Side Left  -CT      Pain Location knee  -CT      Recorded by [CT] Diana Moses, PT 07/19/18 1400      Row Name 07/19/18 4477              Respiratory WDL    Respiratory WDL WDL  -CT      Recorded by [CT] Diana Moses, PT 07/19/18 1400      Row Name 07/19/18 1357             Breath Sounds    Breath Sounds All Fields  -CT      All Lung Fields Breath Sounds clear;equal bilaterally  -CT      Recorded by [CT] Diana Moses, PT 07/19/18 1400      Row Name                Wound 07/17/18 1015 Left knee incision    Wound - Properties Group Date first assessed: 07/17/18 [WM] Time first assessed: 1015 [WM] Side: Left [WM] Location: knee [WM] Type: incision [WM] Recorded by:  [WM] Baudilio Garrido RN 07/17/18 1015    Row Name 07/19/18 1357             Plan of Care Review    Plan of Care Reviewed With patient  -CT      Recorded by [CT] Diana Moses, PT 07/19/18 1400      Row Name 07/19/18 1357             Outcome Summary/Treatment Plan (PT)    Daily Summary of Progress (PT) progress toward functional goals is good  -CT      Recorded by [CT] Diana Moses, PT 07/19/18 1400        User Key  (r) = Recorded By, (t) = Taken By, (c) = Cosigned By    Initials Name Effective Dates Discipline    WM Baudilio Garrido RN 06/16/16 -  Nurse    CT Diana Moses, PT 04/03/18 -  PT          Wound 07/17/18 1015 Left knee incision (Active)   Dressing Appearance dry;intact 7/19/2018  8:00 AM   Closure Liquid skin adhesive 7/19/2018  9:00 AM   Dressing Care, Wound gauze, dry;dressing applied 7/19/2018  9:00 AM             Physical Therapy Education     Title: PT OT SLP Therapies (Done)     Topic: Physical Therapy (Done)     Point: Mobility training (Done)    Learning Progress Summary     Learner Status Readiness Method Response Comment Documented by    Patient Done Acceptance E,TB VU  CT 07/19/18 1400     Done Acceptance E,TB VU  CT 07/18/18 1608     Done Acceptance E,TB VU  DM 07/17/18 1829     Done Acceptance E,TB NR,VU  CT 07/17/18 1526          Point: Home exercise program (Done)    Learning Progress Summary     Learner Status Readiness Method Response Comment Documented  by    Patient Done Acceptance E,TB VU  CT 07/19/18 1400     Done Acceptance E,TB VU  CT 07/18/18 1608     Done Acceptance E,TB VU  DM 07/17/18 1829     Done Acceptance E,TB NR,VU  CT 07/17/18 1526          Point: Body mechanics (Done)    Learning Progress Summary     Learner Status Readiness Method Response Comment Documented by    Patient Done Acceptance E,TB VU  CT 07/19/18 1400     Done Acceptance E,TB VU  CT 07/18/18 1608     Done Acceptance E,TB VU  DM 07/17/18 1829     Done Acceptance E,TB NR,VU  CT 07/17/18 1526          Point: Precautions (Done)    Learning Progress Summary     Learner Status Readiness Method Response Comment Documented by    Patient Done Acceptance E,TB VU  CT 07/19/18 1400     Done Acceptance E,TB VU  CT 07/18/18 1608     Done Acceptance E,TB VU  DM 07/17/18 1829     Done Acceptance E,TB NR,VU  CT 07/17/18 1526                      User Key     Initials Effective Dates Name Provider Type Discipline    DM 06/16/16 -  Adeola Jennings, RN Registered Nurse Nurse    CT 04/03/18 -  Diana Moses, BARBARA Physical Therapist PT                    PT Recommendation and Plan  Anticipated Discharge Disposition (PT): home with home health, home with 24/7 care  Planned Therapy Interventions (PT Eval): balance training, bed mobility training, gait training, home exercise program, manual therapy techniques, motor coordination training, neuromuscular re-education, patient/family education, postural re-education, stair training, strengthening, transfer training  Therapy Frequency (PT Clinical Impression): 2 times/day  Outcome Summary/Treatment Plan (PT)  Daily Summary of Progress (PT): progress toward functional goals is good  Anticipated Equipment Needs at Discharge (PT): front wheeled walker  Anticipated Discharge Disposition (PT): home with home health, home with 24/7 care  Plan of Care Reviewed With: patient  Progress: improving          Outcome Measures     Row Name 07/19/18 1400 07/18/18 1600 07/17/18  1500       How much help from another person do you currently need...    Turning from your back to your side while in flat bed without using bedrails? 3  -CT 3  -CT 3  -CT    Moving from lying on back to sitting on the side of a flat bed without bedrails? 2  -CT 2  -CT 1  -CT    Moving to and from a bed to a chair (including a wheelchair)? 2  -CT 2  -CT 1  -CT    Standing up from a chair using your arms (e.g., wheelchair, bedside chair)? 2  -CT 2  -CT 1  -CT    Climbing 3-5 steps with a railing? 2  -CT 2  -CT 1  -CT    To walk in hospital room? 3  -CT 3  -CT 2  -CT    AM-PAC 6 Clicks Score 14  -CT 14  -CT 9  -CT       Functional Assessment    Outcome Measure Options AM-PAC 6 Clicks Basic Mobility (PT)  -CT AM-PAC 6 Clicks Basic Mobility (PT)  -CT AM-PAC 6 Clicks Basic Mobility (PT)  -CT      User Key  (r) = Recorded By, (t) = Taken By, (c) = Cosigned By    Initials Name Provider Type    CT Diana Moses PT Physical Therapist           Time Calculation:         PT Charges     Row Name 07/19/18 1409             Time Calculation- PT    Total Timed Code Minutes- PT 39 minute(s)  -CT         Timed Charges    81354 - Gait Training Minutes  29  -CT      01857 - PT Therapeutic Activity Minutes 10  -CT        User Key  (r) = Recorded By, (t) = Taken By, (c) = Cosigned By    Initials Name Provider Type    CT Diana Moses PT Physical Therapist          Therapy Charges for Today     Code Description Service Date Service Provider Modifiers Qty    59620253119 HC GAIT TRAINING EA 15 MIN 7/18/2018 Diana Moses, PT GP 1    25783374232 HC PT THERAPEUTIC ACT EA 15 MIN 7/18/2018 Diana Moses, PT GP 1    58902124319 HC PT THER SUPP EA 15 MIN 7/18/2018 Diana Moses, PT GP 2    12897876199 HC GAIT TRAINING EA 15 MIN 7/18/2018 Diana Moses, PT GP 1    90957204665 HC PT THERAPEUTIC ACT EA 15 MIN 7/18/2018 Diana Moses, PT GP 2    01258240593 HC PT THER SUPP EA 15 MIN 7/18/2018 Diana Moses, PT GP 3    21515115543 HC GAIT  TRAINING EA 15 MIN 7/19/2018 Diana Moses, PT GP 2    56670376797 HC PT THERAPEUTIC ACT EA 15 MIN 7/19/2018 Diana Moses, PT GP 1    57420656239 HC PT THER SUPP EA 15 MIN 7/19/2018 Diana Moses, PT GP 3          PT G-Codes  Outcome Measure Options: AM-PAC 6 Clicks Basic Mobility (PT)  Score: 9  Functional Limitation: Mobility: Walking and moving around  Mobility: Walking and Moving Around Current Status (): At least 60 percent but less than 80 percent impaired, limited or restricted  Mobility: Walking and Moving Around Goal Status (): At least 40 percent but less than 60 percent impaired, limited or restricted    Diana Moses, PT  7/19/2018

## 2018-07-19 NOTE — PROGRESS NOTES
Discharge Planning Assessment  MICKEY Garcia     Patient Name: Aydee Becker  MRN: [id number]  Today's Date: 7/19/2018    Admit Date: 7/17/2018      Discharge Plan     Row Name 07/19/18 1055       Plan    Final Discharge Disposition Code 06 - home with home health care    Final Note Pt is being discharged home today. SS received order for home health services. [place] [facility] [place] and is agreeable for [facility] to be arranged. SS contacted [facility] [phone] per Sheila with referral. SS faxed referral including order to [facility] [phone]. Nurse to call report to [facility]. Pt has a rolling walker and bedside commode at home and declines new DME. No other needs identified.         Blanca Polanco

## 2018-07-19 NOTE — DISCHARGE SUMMARY
Aydee Becekr  1945  [id number]      Date of Discharge:  7/19/2018      Discharge Diagnosis:   Avascular necrosis of medial femoral condyle, left knee  Primary osteoarthritis left knee    Procedures Performed  Procedure(s):  TOTAL KNEE ARTHROPLASTY         Hospital Course  Patient is a 73 y.o. female presented with debilitating left knee pain  Her x-rays and MRI revealed significant avascular necrosis of the medial femoral condyle.  As well as primary osteoarthritis. She was unresponsive to conservative measures and has significant debilitating pain.  Given this she underwent left total knee arthroplasty.  Intraoperatively was fairly uneventful.  Postoperatively x-rays were obtained revealing prosthesis in good alignment and position.  Physical therapy was consultative to begin on postoperative day 1.  On postoperative day 1 she complained of moderate pain.  Her vital signs are stable and she was afebrile.  On postoperative day 2 she was sitting up and bedside chair.  Her incision was inspected without signs of infection.  She was able to participate in physical therapy and walk approximately 10 feet and tolerated well.  Her vital signs are stable and she was afebrile.  White blood count hemoglobin within normal limits.  The remainder of her stay was fairly uneventful and she was deemed medically stable and discharged home on 7/19/2018.  Home health was consultative to continue with her physical therapy.  She was discharged home with Norco for pain and Eliquis DVT prophylaxis.  She will return back to the clinic in 10 days for follow-up.        Consults:   Consults     Date and Time Order Name Status Description    7/17/2018 1038 Inpatient Consult to Hospitalist Completed             Condition on Discharge:  Stable      Vital Signs  Vitals:    07/18/18 2359 07/19/18 0030 07/19/18 0438 07/19/18 0632   BP:  125/63 133/69 138/76   BP Location:  Left arm Left arm Right arm   Patient Position:  Lying Lying Lying    Pulse:  89 82 82   Resp:    18   Temp:  97.6 °F (36.4 °C) 98.8 °F (37.1 °C) 98.4 °F (36.9 °C)   TempSrc:  Oral Oral Oral   SpO2: 94% 96%     Weight:       Height:             Discharge Disposition  Home or Self Care      Discharge Medications     Discharge Medications      New Medications      Instructions Start Date   apixaban 2.5 MG tablet tablet  Commonly known as:  ELIQUIS   2.5 mg, Oral, Every 12 Hours Scheduled      docusate sodium 100 MG capsule  Commonly known as:  COLACE   100 mg, Oral, 2 Times Daily PRN      lisinopril 20 MG tablet  Commonly known as:  PRINIVIL,ZESTRIL   20 mg, Oral, Daily      oxyCODONE-acetaminophen 5-325 MG per tablet  Commonly known as:  PERCOCET   1 tablet, Oral, Every 4 Hours PRN         Changes to Medications      Instructions Start Date   HYDROcodone-acetaminophen 7.5-325 MG per tablet  Commonly known as:  NORCO  What changed:  · how much to take  · when to take this   1 tablet, Oral, Every 6 Hours PRN         Continue These Medications      Instructions Start Date   sertraline 50 MG tablet  Commonly known as:  ZOLOFT   50 mg, Oral, Daily      VYZULTA 0.024 % solution  Generic drug:  Latanoprostene Bunod   1 drop, Both Eyes, Nightly         Stop These Medications    lisinopril-hydrochlorothiazide 20-12.5 MG per tablet  Commonly known as:  PRINZIDE,ZESTORETIC              Follow-up Appointments  Future Appointments  Date Time Provider Department Center   8/3/2018 8:30 AM Kingston Moreno MD [facility] None   8/28/2018 10:00 AM Carlos Manzo MD [facility] None     Additional Instructions for the Follow-ups that You Need to Schedule     Ambulatory Referral to Home Health    As directed      Face to Face Visit Date:  7/19/2018    Follow-up Provider for Plan of Care?:  I will be treating the patient on an ongoing basis.  Please send me the Plan of Care for signature.    Follow-up Provider:  KINGSTON MORENO [[id number]]    Reason/Clinical Findings:  s/p Total  knee Arthroplasty    Describe mobility limitations that make leaving home difficult:  post op weakness    Nursing/Therapeutic Services Requested:  Physical Therapy    PT orders:  Total joint pathway Gait Training    Weight Bearing Status:  As Tolerated         Discharge Follow-up with PCP    As directed      Currently Documented PCP:  Miki Bay MD  PCP Phone Number:  [phone]    Follow Up Details:  1 week, repeat CMP for electrolyte abnormalities. HCTZ held until seen by PCP. I have prescribed Lisinopril in the meantine without HCTZ combo         Discharge Follow-up with Specified Provider: Dr. Moreno    As directed      To:  Dr. Moreno    Follow Up Details:  10 days

## 2018-07-19 NOTE — PROGRESS NOTES
Inpatient Progress Note  Aydee Becker  Date: 07/19/18  MRN: [id number]      Subjective:  Aydee Becker is a 73 y.o. female POD#2 Left TKA.  She is doing well this morning and sitting up in the bedside chair.  She is complaining of moderate pain with any movement of the knee.  She is tolerating the knee immobilizer.  She was able to walk several feet yesterday and tolerated well. She denies paresthesias.  She has no new complaints this morning.    Objective:    Vitals:    07/18/18 2359 07/19/18 0030 07/19/18 0438 07/19/18 0632   BP:  125/63 133/69 138/76   BP Location:  Left arm Left arm Right arm   Patient Position:  Lying Lying Lying   Pulse:  89 82 82   Resp:    18   Temp:  97.6 °F (36.4 °C) 98.8 °F (37.1 °C) 98.4 °F (36.9 °C)   TempSrc:  Oral Oral Oral   SpO2: 94% 96%     Weight:       Height:          She is alert and oriented ×3.  Examination of the left knee reveals clean and dry incision.  She has mild swelling.  No calf tenderness.  Active dorsiflexion and plantarflexion.  Her neurovascular status is intact.    Labs:      Results from last 7 days  Lab Units 07/19/18  0058 07/18/18  0039 07/17/18  1618   WBC 10*3/mm3 11.18 11.11 12.40   HEMOGLOBIN g/dL 11.5* 12.1 12.3   HEMATOCRIT % 34.1* 35.8* 36.6*   MCV fL 91.4 91.8 91.0   MCHC g/dL 33.7 33.8 33.6   PLATELETS 10*3/mm3 227 229 213           Results from last 7 days  Lab Units 07/19/18  0058 07/18/18  1850 07/18/18  0039 07/17/18  1618   SODIUM mmol/L 128*  --  130* 134*   POTASSIUM mmol/L 3.9 3.7 3.2* 3.5   MAGNESIUM mg/dL 2.1  --  3.1* 1.7   CHLORIDE mmol/L 97*  --  96* 101   CO2 mmol/L 22.4*  --  24.8 26.2   BUN mg/dL 9  --  8 9   CREATININE mg/dL 0.55  --  0.64 0.68   EGFR IF NONAFRICN AM mL/min/1.73 108  --  91 85   CALCIUM mg/dL 9.0  --  8.9 9.1   GLUCOSE mg/dL 109  --  135* 142*   ALBUMIN g/dL 3.60  --  3.70 3.80   BILIRUBIN mg/dL 0.9  --  0.7 0.5   ALK PHOS U/L 87  --  76 79   AST (SGOT) U/L 47*  --  19 16   ALT (SGPT) U/L 40*  --  15 14    Estimated Creatinine Clearance: 58.2 mL/min (by C-G formula based on SCr of 0.55 mg/dL).  No results found for: AMMONIA          Hemoglobin A1C   Date Value Ref Range Status   07/17/2018 5.50 4.50 - 5.70 % Final     Lab Results   Component Value Date    TSH 2.871 07/17/2018         Pain Management Panel     There is no flowsheet data to display.            Radiology:  Imaging Results (last 72 hours)     Procedure Component Value Units Date/Time    XR Knee 1 or 2 View Left [[id number]] Collected:  07/17/18 1119     Updated:  07/17/18 1121    Narrative:       EXAMINATION: XR KNEE 1 OR 2 VW LEFT-      CLINICAL INDICATION:post op        COMPARISON: None      TECHNIQUE: 2 views left knee     FINDINGS:   Patient is status post total left knee arthroplasty. Anatomic alignment.  No hardware complication.           Impression:       As above.     This report was finalized on 7/17/2018 11:19 AM by Dr. Malcolm Pompa MD.               Assessment:      ICD-10-CM ICD-9-CM   1. Primary osteoarthritis of left knee M17.12 715.16       Plan:  POD#2 Left TKA.  Doing well.  Pain controlled.  Physical therapy  today per protocol with range of motion and weightbearing as tolerated.  We will continue to watch H&H.  We will continue Eliquis for DVT prophylaxis. Possible discharge today.   We'll continue to watch closely      CURTIS Pathak

## 2018-07-20 ENCOUNTER — DOCUMENTATION (OUTPATIENT)
Dept: ORTHOPEDIC SURGERY | Facility: CLINIC | Age: 73
End: 2018-07-20

## 2018-07-20 NOTE — PROGRESS NOTES
New auth for inpatient admit from surgery  [id number]. Per Ibeth.      Called Pat at [facility] compliance with new number AUTH#

## 2018-08-03 ENCOUNTER — OFFICE VISIT (OUTPATIENT)
Dept: ORTHOPEDIC SURGERY | Facility: CLINIC | Age: 73
End: 2018-08-03

## 2018-08-03 VITALS — WEIGHT: 153 LBS | BODY MASS INDEX: 27.11 KG/M2 | HEIGHT: 63 IN

## 2018-08-03 DIAGNOSIS — Z96.652 S/P TOTAL KNEE ARTHROPLASTY, LEFT: Primary | ICD-10-CM

## 2018-08-03 PROCEDURE — 99024 POSTOP FOLLOW-UP VISIT: CPT | Performed by: ORTHOPAEDIC SURGERY

## 2018-08-03 RX ORDER — HYDROCODONE BITARTRATE AND ACETAMINOPHEN 7.5; 325 MG/1; MG/1
1 TABLET ORAL EVERY 6 HOURS PRN
Qty: 24 TABLET | Refills: 0 | Status: SHIPPED | OUTPATIENT
Start: 2018-08-03

## 2018-08-03 NOTE — PROGRESS NOTES
Follow-up Visit         Patient: Aydee Becker  YOB: 1945  Date of Encounter: 08/03/2018      HPI:  Aydee Becker, 73 y.o. female seen today in postoperative follow-up for left total knee arthroplasty on 07/17/2018.  She is doing well and has attended 2 courses of physical therapy.  She reports her pain is improving.  She remains on Eliquis.     Medical History:  Patient Active Problem List   Diagnosis   • Avascular necrosis of medial femoral condyle, left (CMS/HCC)   • Hypertension   • Glaucoma   • Primary osteoarthritis of left knee   • Murmur   • Abnormal ECG   • Preop cardiovascular exam     Past Medical History:   Diagnosis Date   • Arthritis    • Depression    • Diastolic dysfunction    • Glaucoma    • Heart murmur    • Hypertension    • Knee pain, left    • Mild pulmonary hypertension        Social History:  Social History     Social History   • Marital status:      Spouse name: N/A   • Number of children: N/A   • Years of education: N/A     Occupational History   • Not on file.     Social History Main Topics   • Smoking status: Never Smoker   • Smokeless tobacco: Never Used   • Alcohol use No   • Drug use: No   • Sexual activity: Defer     Other Topics Concern   • Not on file     Social History Narrative   • No narrative on file       Surgical History:  Past Surgical History:   Procedure Laterality Date   • CATARACT EXTRACTION Left     with stent placement for glaucoma   • TOTAL KNEE ARTHROPLASTY Left 7/17/2018    Procedure: TOTAL KNEE ARTHROPLASTY;  Surgeon: Ignacio Moreno MD;  Location: [facility];  Service: Orthopedics       Radiology:  Left Knee : Total knee arthroplasty good position and alignment.    Examination:  Examination left knee reveals extension lacking 5°, she flexes to 90, she has midline incision which is intact.  No surrounding erythema.    Assessment & Plan:   73-year-old female 2 1/2 weeks out from left total knee arthroplasty. She is to continue  physical therapy for additional 4 weeks for ROM and strengthening. She will continue the Eliquis until completed and begin aspirin 325mg daily. She is given Norco 7.5/325mg #24 today. Follow-up in 6 weeks.      Diagnosis Plan   1. S/P total knee arthroplasty, left           Cc:  Miki Bay MD    Scribed for Ignacio Moreno MD by Jeannine Moreno RN.10:46 AM 08/03/2018

## 2018-09-17 ENCOUNTER — HOSPITAL ENCOUNTER (OUTPATIENT)
Dept: GENERAL RADIOLOGY | Facility: HOSPITAL | Age: 73
Discharge: HOME | End: 2018-09-17
Attending: ORTHOPAEDIC SURGERY | Admitting: ORTHOPAEDIC SURGERY

## 2018-09-17 ENCOUNTER — OFFICE VISIT (OUTPATIENT)
Dept: ORTHOPEDIC SURGERY | Facility: CLINIC | Age: 73
End: 2018-09-17

## 2018-09-17 VITALS — BODY MASS INDEX: 26.58 KG/M2 | WEIGHT: 150 LBS | HEIGHT: 63 IN

## 2018-09-17 DIAGNOSIS — T84.093S FAILED TOTAL KNEE, LEFT, SEQUELA: ICD-10-CM

## 2018-09-17 DIAGNOSIS — Z96.652 S/P TOTAL KNEE ARTHROPLASTY, LEFT: Primary | ICD-10-CM

## 2018-09-17 PROCEDURE — 73562 X-RAY EXAM OF KNEE 3: CPT | Performed by: RADIOLOGY

## 2018-09-17 PROCEDURE — 99024 POSTOP FOLLOW-UP VISIT: CPT | Performed by: ORTHOPAEDIC SURGERY

## 2018-09-17 PROCEDURE — 73562 X-RAY EXAM OF KNEE 3: CPT

## 2018-09-17 NOTE — PROGRESS NOTES
Follow-up Visit         Patient: Aydee Becker  YOB: 1945  Date of Encounter: 09/17/2018      Chief  Complaint:   Chief Complaint   Patient presents with   • Left Knee - Post-op, Follow-up   • Post-op Knee     Left total knee arthroplasty 07/17/18         HPI:  Aydee Becker, 73 y.o. female presents today in follow-up with left knee following left total knee replacement on July 17, 2018 for advanced avascular necrosis.  She is doing remarkably well.  She is served to walk without limp.  She has no pain other than mild discomfort at the end of the day.  She complains of mild swelling.    Medical History:  Patient Active Problem List   Diagnosis   • Avascular necrosis of medial femoral condyle, left (CMS/HCC)   • Hypertension   • Glaucoma   • Primary osteoarthritis of left knee   • Murmur   • Abnormal ECG   • Preop cardiovascular exam     Past Medical History:   Diagnosis Date   • Arthritis    • Depression    • Diastolic dysfunction    • Glaucoma    • Heart murmur    • Hypertension    • Knee pain, left    • Mild pulmonary hypertension        Social History:  Social History     Social History   • Marital status:      Spouse name: N/A   • Number of children: N/A   • Years of education: N/A     Occupational History   • Not on file.     Social History Main Topics   • Smoking status: Never Smoker   • Smokeless tobacco: Never Used   • Alcohol use No   • Drug use: No   • Sexual activity: Defer     Other Topics Concern   • Not on file     Social History Narrative   • No narrative on file       Surgical History:  Past Surgical History:   Procedure Laterality Date   • CATARACT EXTRACTION Left     with stent placement for glaucoma   • TOTAL KNEE ARTHROPLASTY Left 7/17/2018    Procedure: TOTAL KNEE ARTHROPLASTY;  Surgeon: Ignacio Moreno MD;  Location: [facility];  Service: Orthopedics       Radiology: Right total knee arthroplasty good position and alignment femoral and tibial components  replaced, native patella present.      Examination:   Left knee evaluation reveals midline incision intact.  She demonstrates full extension and flexion to 120°.      Assessment & Plan:   73 y.o. female doing remarkably well following left total knee replacement 2 months ago.  She wishes to return to work and we will allow her to do so full duty with no restrictions.  Given her quick recovery I think it's reasonable to allow her to return in the future on an as-needed basis.         Diagnosis Plan   1. S/P total knee arthroplasty, left  XR Knee 3 View Left             Cc:  Miki Bay MD      Scribed for Ignacio Moreno MD by Jeannine Moreno RN.11:10 AM 09/17/2018

## 2020-10-15 ENCOUNTER — OFFICE VISIT (OUTPATIENT)
Dept: SURGERY | Facility: CLINIC | Age: 75
End: 2020-10-15

## 2020-10-15 VITALS — HEIGHT: 64 IN | BODY MASS INDEX: 23.9 KG/M2 | WEIGHT: 140 LBS

## 2020-10-15 DIAGNOSIS — Z93.1 GASTROSTOMY IN PLACE (HCC): Primary | ICD-10-CM

## 2020-10-15 PROCEDURE — 99202 OFFICE O/P NEW SF 15 MIN: CPT | Performed by: SURGERY

## 2020-10-15 NOTE — PROGRESS NOTES
Subjective   Aydee Becker is a 75 y.o. female.     Chief Complaint: PEG in that is no longer needed.    History of Present Illness She had COVID a couple of months ago and had trouble swallowing. Her PEG is no longer needed as she is doing much better now.     The following portions of the patient's history were reviewed and updated as appropriate: current medications, past family history, past medical history, past social history, past surgical history and problem list.    Review of Systems    Objective   Physical Exam PEG was simply pulled out and a dressing applied    Assessment/Plan   Diagnoses and all orders for this visit:    1. Gastrostomy in place (CMS/[facility]) (Primary)      Return prn

## 2020-10-27 ENCOUNTER — LAB REQUISITION (OUTPATIENT)
Dept: LAB | Facility: HOSPITAL | Age: 75
End: 2020-10-27

## 2020-10-27 DIAGNOSIS — J96.01 ACUTE RESPIRATORY FAILURE WITH HYPOXIA (HCC): ICD-10-CM

## 2020-10-27 DIAGNOSIS — N39.0 URINARY TRACT INFECTION, SITE NOT SPECIFIED: ICD-10-CM

## 2020-10-27 LAB
ALBUMIN SERPL-MCNC: 4.14 G/DL (ref 3.5–5.2)
ALBUMIN/GLOB SERPL: 1.7 G/DL
ALP SERPL-CCNC: 74 U/L (ref 39–117)
ALT SERPL W P-5'-P-CCNC: 7 U/L (ref 1–33)
ANION GAP SERPL CALCULATED.3IONS-SCNC: 11 MMOL/L (ref 5–15)
AST SERPL-CCNC: 14 U/L (ref 1–32)
BILIRUB SERPL-MCNC: 0.4 MG/DL (ref 0–1.2)
BUN SERPL-MCNC: 12 MG/DL (ref 8–23)
BUN/CREAT SERPL: 16.9 (ref 7–25)
CALCIUM SPEC-SCNC: 10 MG/DL (ref 8.6–10.5)
CHLORIDE SERPL-SCNC: 101 MMOL/L (ref 98–107)
CO2 SERPL-SCNC: 30 MMOL/L (ref 22–29)
CREAT SERPL-MCNC: 0.71 MG/DL (ref 0.57–1)
DEPRECATED RDW RBC AUTO: 43.1 FL (ref 37–54)
ERYTHROCYTE [DISTWIDTH] IN BLOOD BY AUTOMATED COUNT: 12.5 % (ref 12.3–15.4)
ERYTHROCYTE [SEDIMENTATION RATE] IN BLOOD: 26 MM/HR (ref 0–30)
GFR SERPL CREATININE-BSD FRML MDRD: 80 ML/MIN/1.73
GLOBULIN UR ELPH-MCNC: 2.5 GM/DL
GLUCOSE SERPL-MCNC: 85 MG/DL (ref 65–99)
HCT VFR BLD AUTO: 34.3 % (ref 34–46.6)
HGB BLD-MCNC: 11 G/DL (ref 12–15.9)
MCH RBC QN AUTO: 30 PG (ref 26.6–33)
MCHC RBC AUTO-ENTMCNC: 32.1 G/DL (ref 31.5–35.7)
MCV RBC AUTO: 93.5 FL (ref 79–97)
PLATELET # BLD AUTO: 219 10*3/MM3 (ref 140–450)
PMV BLD AUTO: 10.4 FL (ref 6–12)
POTASSIUM SERPL-SCNC: 3.7 MMOL/L (ref 3.5–5.2)
PROT SERPL-MCNC: 6.6 G/DL (ref 6–8.5)
RBC # BLD AUTO: 3.67 10*6/MM3 (ref 3.77–5.28)
SODIUM SERPL-SCNC: 142 MMOL/L (ref 136–145)
WBC # BLD AUTO: 7.39 10*3/MM3 (ref 3.4–10.8)

## 2020-10-27 PROCEDURE — 85027 COMPLETE CBC AUTOMATED: CPT | Performed by: FAMILY MEDICINE

## 2020-10-27 PROCEDURE — 87086 URINE CULTURE/COLONY COUNT: CPT | Performed by: FAMILY MEDICINE

## 2020-10-27 PROCEDURE — 80053 COMPREHEN METABOLIC PANEL: CPT | Performed by: FAMILY MEDICINE

## 2020-10-27 PROCEDURE — 81001 URINALYSIS AUTO W/SCOPE: CPT | Performed by: FAMILY MEDICINE

## 2020-10-27 PROCEDURE — 85652 RBC SED RATE AUTOMATED: CPT | Performed by: FAMILY MEDICINE

## 2020-10-28 LAB
BACTERIA UR QL AUTO: ABNORMAL /HPF
BILIRUB UR QL STRIP: NEGATIVE
CLARITY UR: ABNORMAL
COLOR UR: YELLOW
GLUCOSE UR STRIP-MCNC: NEGATIVE MG/DL
HGB UR QL STRIP.AUTO: NEGATIVE
HYALINE CASTS UR QL AUTO: ABNORMAL /LPF
KETONES UR QL STRIP: NEGATIVE
LEUKOCYTE ESTERASE UR QL STRIP.AUTO: ABNORMAL
NITRITE UR QL STRIP: POSITIVE
PH UR STRIP.AUTO: 7 [PH] (ref 5–8)
PROT UR QL STRIP: NEGATIVE
RBC # UR: ABNORMAL /HPF
REF LAB TEST METHOD: ABNORMAL
SP GR UR STRIP: 1.02 (ref 1–1.03)
SQUAMOUS #/AREA URNS HPF: ABNORMAL /HPF
UROBILINOGEN UR QL STRIP: ABNORMAL
WBC UR QL AUTO: ABNORMAL /HPF

## 2020-10-29 LAB — BACTERIA SPEC AEROBE CULT: NORMAL

## 2020-12-08 ENCOUNTER — LAB REQUISITION (OUTPATIENT)
Dept: LAB | Facility: HOSPITAL | Age: 75
End: 2020-12-08

## 2020-12-08 DIAGNOSIS — U07.1 COVID-19: ICD-10-CM

## 2020-12-08 DIAGNOSIS — Z86.19 PERSONAL HISTORY OF OTHER INFECTIOUS AND PARASITIC DISEASES: ICD-10-CM

## 2020-12-08 LAB
ALBUMIN SERPL-MCNC: 3.01 G/DL (ref 3.5–5.2)
ALBUMIN/GLOB SERPL: 1.2 G/DL
ALP SERPL-CCNC: 72 U/L (ref 39–117)
ALT SERPL W P-5'-P-CCNC: <5 U/L (ref 1–33)
ANION GAP SERPL CALCULATED.3IONS-SCNC: 10.6 MMOL/L (ref 5–15)
AST SERPL-CCNC: 8 U/L (ref 1–32)
BACTERIA UR QL AUTO: ABNORMAL /HPF
BASOPHILS # BLD AUTO: 0.03 10*3/MM3 (ref 0–0.2)
BASOPHILS NFR BLD AUTO: 0.4 % (ref 0–1.5)
BILIRUB SERPL-MCNC: 0.2 MG/DL (ref 0–1.2)
BILIRUB UR QL STRIP: ABNORMAL
BUN SERPL-MCNC: 21 MG/DL (ref 8–23)
BUN/CREAT SERPL: 31.3 (ref 7–25)
CALCIUM SPEC-SCNC: 9.1 MG/DL (ref 8.6–10.5)
CHLORIDE SERPL-SCNC: 98 MMOL/L (ref 98–107)
CLARITY UR: ABNORMAL
CO2 SERPL-SCNC: 29.4 MMOL/L (ref 22–29)
COARSE GRAN CASTS URNS QL MICRO: ABNORMAL /LPF
COLOR UR: ABNORMAL
CREAT SERPL-MCNC: 0.67 MG/DL (ref 0.57–1)
DEPRECATED RDW RBC AUTO: 39.8 FL (ref 37–54)
EOSINOPHIL # BLD AUTO: 0.24 10*3/MM3 (ref 0–0.4)
EOSINOPHIL NFR BLD AUTO: 3.5 % (ref 0.3–6.2)
ERYTHROCYTE [DISTWIDTH] IN BLOOD BY AUTOMATED COUNT: 12.4 % (ref 12.3–15.4)
GFR SERPL CREATININE-BSD FRML MDRD: 86 ML/MIN/1.73
GLOBULIN UR ELPH-MCNC: 2.5 GM/DL
GLUCOSE SERPL-MCNC: 101 MG/DL (ref 65–99)
GLUCOSE UR STRIP-MCNC: NEGATIVE MG/DL
HCT VFR BLD AUTO: 35.4 % (ref 34–46.6)
HGB BLD-MCNC: 11.5 G/DL (ref 12–15.9)
HGB UR QL STRIP.AUTO: NEGATIVE
HYALINE CASTS UR QL AUTO: ABNORMAL /LPF
IMM GRANULOCYTES # BLD AUTO: 0.05 10*3/MM3 (ref 0–0.05)
IMM GRANULOCYTES NFR BLD AUTO: 0.7 % (ref 0–0.5)
KETONES UR QL STRIP: ABNORMAL
LEUKOCYTE ESTERASE UR QL STRIP.AUTO: ABNORMAL
LYMPHOCYTES # BLD AUTO: 1.19 10*3/MM3 (ref 0.7–3.1)
LYMPHOCYTES NFR BLD AUTO: 17.2 % (ref 19.6–45.3)
MCH RBC QN AUTO: 28.6 PG (ref 26.6–33)
MCHC RBC AUTO-ENTMCNC: 32.5 G/DL (ref 31.5–35.7)
MCV RBC AUTO: 88.1 FL (ref 79–97)
MONOCYTES # BLD AUTO: 0.5 10*3/MM3 (ref 0.1–0.9)
MONOCYTES NFR BLD AUTO: 7.2 % (ref 5–12)
NEUTROPHILS NFR BLD AUTO: 4.89 10*3/MM3 (ref 1.7–7)
NEUTROPHILS NFR BLD AUTO: 71 % (ref 42.7–76)
NITRITE UR QL STRIP: POSITIVE
NRBC BLD AUTO-RTO: 0 /100 WBC (ref 0–0.2)
PH UR STRIP.AUTO: 6 [PH] (ref 5–8)
PLATELET # BLD AUTO: 238 10*3/MM3 (ref 140–450)
PMV BLD AUTO: 10.5 FL (ref 6–12)
POTASSIUM SERPL-SCNC: 2.7 MMOL/L (ref 3.5–5.2)
PROT SERPL-MCNC: 5.5 G/DL (ref 6–8.5)
PROT UR QL STRIP: ABNORMAL
RBC # BLD AUTO: 4.02 10*6/MM3 (ref 3.77–5.28)
RBC # UR: ABNORMAL /HPF
REF LAB TEST METHOD: ABNORMAL
SODIUM SERPL-SCNC: 138 MMOL/L (ref 136–145)
SP GR UR STRIP: 1.02 (ref 1–1.03)
SQUAMOUS #/AREA URNS HPF: ABNORMAL /HPF
UROBILINOGEN UR QL STRIP: ABNORMAL
WBC # BLD AUTO: 6.9 10*3/MM3 (ref 3.4–10.8)
WBC UR QL AUTO: ABNORMAL /HPF

## 2020-12-08 PROCEDURE — 85025 COMPLETE CBC W/AUTO DIFF WBC: CPT | Performed by: PHYSICIAN ASSISTANT

## 2020-12-08 PROCEDURE — 87086 URINE CULTURE/COLONY COUNT: CPT | Performed by: PHYSICIAN ASSISTANT

## 2020-12-08 PROCEDURE — 80053 COMPREHEN METABOLIC PANEL: CPT | Performed by: PHYSICIAN ASSISTANT

## 2020-12-08 PROCEDURE — 87077 CULTURE AEROBIC IDENTIFY: CPT | Performed by: PHYSICIAN ASSISTANT

## 2020-12-08 PROCEDURE — 81001 URINALYSIS AUTO W/SCOPE: CPT | Performed by: PHYSICIAN ASSISTANT

## 2020-12-08 PROCEDURE — 87186 SC STD MICRODIL/AGAR DIL: CPT | Performed by: PHYSICIAN ASSISTANT

## 2020-12-10 LAB — BACTERIA SPEC AEROBE CULT: ABNORMAL

## 2020-12-14 ENCOUNTER — LAB REQUISITION (OUTPATIENT)
Dept: LAB | Facility: HOSPITAL | Age: 75
End: 2020-12-14

## 2020-12-14 DIAGNOSIS — Z86.19 PERSONAL HISTORY OF OTHER INFECTIOUS AND PARASITIC DISEASES: ICD-10-CM

## 2020-12-14 LAB
ANION GAP SERPL CALCULATED.3IONS-SCNC: 7.4 MMOL/L (ref 5–15)
BUN SERPL-MCNC: 10 MG/DL (ref 8–23)
BUN/CREAT SERPL: 16.1 (ref 7–25)
CALCIUM SPEC-SCNC: 9.3 MG/DL (ref 8.6–10.5)
CHLORIDE SERPL-SCNC: 108 MMOL/L (ref 98–107)
CO2 SERPL-SCNC: 23.6 MMOL/L (ref 22–29)
CREAT SERPL-MCNC: 0.62 MG/DL (ref 0.57–1)
GFR SERPL CREATININE-BSD FRML MDRD: 94 ML/MIN/1.73
GLUCOSE SERPL-MCNC: 88 MG/DL (ref 65–99)
POTASSIUM SERPL-SCNC: 5.3 MMOL/L (ref 3.5–5.2)
SODIUM SERPL-SCNC: 139 MMOL/L (ref 136–145)

## 2020-12-14 PROCEDURE — 80048 BASIC METABOLIC PNL TOTAL CA: CPT | Performed by: FAMILY MEDICINE

## 2021-01-18 ENCOUNTER — HOSPITAL ENCOUNTER (EMERGENCY)
Dept: HOSPITAL 79 - ER1 | Age: 76
Discharge: HOME | End: 2021-01-18
Payer: COMMERCIAL

## 2021-01-18 DIAGNOSIS — A04.72: Primary | ICD-10-CM

## 2021-01-18 LAB
BUN/CREATININE RATIO: 24 (ref 0–10)
HGB BLD-MCNC: 13 GM/DL (ref 12.3–15.3)
RED BLOOD COUNT: 4.64 M/UL (ref 4–5.1)
WHITE BLOOD COUNT: 9.6 K/UL (ref 4.5–11)

## 2021-02-19 ENCOUNTER — HOSPITAL ENCOUNTER (INPATIENT)
Dept: HOSPITAL 79 - ER1 | Age: 76
LOS: 7 days | Discharge: HOME | DRG: 371 | End: 2021-02-26
Attending: INTERNAL MEDICINE | Admitting: INTERNAL MEDICINE
Payer: COMMERCIAL

## 2021-02-19 VITALS — BODY MASS INDEX: 21.9 KG/M2 | HEIGHT: 62 IN | WEIGHT: 119 LBS

## 2021-02-19 DIAGNOSIS — A04.72: Primary | ICD-10-CM

## 2021-02-19 DIAGNOSIS — N39.0: ICD-10-CM

## 2021-02-19 DIAGNOSIS — E43: ICD-10-CM

## 2021-02-19 DIAGNOSIS — E78.5: ICD-10-CM

## 2021-02-19 DIAGNOSIS — E86.0: ICD-10-CM

## 2021-02-19 DIAGNOSIS — I10: ICD-10-CM

## 2021-02-19 DIAGNOSIS — L89.152: ICD-10-CM

## 2021-02-19 DIAGNOSIS — H40.9: ICD-10-CM

## 2021-02-19 DIAGNOSIS — G62.9: ICD-10-CM

## 2021-02-19 DIAGNOSIS — F32.9: ICD-10-CM

## 2021-02-19 DIAGNOSIS — D64.9: ICD-10-CM

## 2021-02-19 DIAGNOSIS — F41.9: ICD-10-CM

## 2021-02-19 DIAGNOSIS — G93.41: ICD-10-CM

## 2021-02-19 DIAGNOSIS — E87.6: ICD-10-CM

## 2021-02-19 DIAGNOSIS — Z86.16: ICD-10-CM

## 2021-02-19 LAB
BUN/CREATININE RATIO: 29 (ref 0–10)
HGB BLD-MCNC: 11.5 GM/DL (ref 12.3–15.3)
RED BLOOD COUNT: 4.01 M/UL (ref 4–5.1)
WHITE BLOOD COUNT: 11.9 K/UL (ref 4.5–11)

## 2021-02-19 PROCEDURE — U0002 COVID-19 LAB TEST NON-CDC: HCPCS

## 2021-02-21 LAB
BUN/CREATININE RATIO: 20 (ref 0–10)
HGB BLD-MCNC: 10.7 GM/DL (ref 12.3–15.3)
RED BLOOD COUNT: 3.73 M/UL (ref 4–5.1)
WHITE BLOOD COUNT: 8.5 K/UL (ref 4.5–11)

## 2021-02-23 LAB
BUN/CREATININE RATIO: 8 (ref 0–10)
HGB BLD-MCNC: 9 GM/DL (ref 12.3–15.3)
RED BLOOD COUNT: 3.18 M/UL (ref 4–5.1)
WHITE BLOOD COUNT: 7.6 K/UL (ref 4.5–11)

## 2021-02-24 ENCOUNTER — TRANSCRIBE ORDERS (OUTPATIENT)
Dept: ADMINISTRATIVE | Facility: HOSPITAL | Age: 76
End: 2021-02-24

## 2021-02-24 DIAGNOSIS — Z01.818 PRE-OPERATIVE CLEARANCE: Primary | ICD-10-CM

## 2021-02-24 LAB
BUN/CREATININE RATIO: 8 (ref 0–10)
HGB BLD-MCNC: 10.5 GM/DL (ref 12.3–15.3)
RED BLOOD COUNT: 3.68 M/UL (ref 4–5.1)
WHITE BLOOD COUNT: 6.9 K/UL (ref 4.5–11)

## 2021-02-26 LAB
BUN/CREATININE RATIO: 14 (ref 0–10)
BUN/CREATININE RATIO: 14 (ref 0–10)
HGB BLD-MCNC: 11 GM/DL (ref 12.3–15.3)
RED BLOOD COUNT: 3.88 M/UL (ref 4–5.1)
WHITE BLOOD COUNT: 7.7 K/UL (ref 4.5–11)

## 2021-03-26 ENCOUNTER — LAB (OUTPATIENT)
Dept: LAB | Facility: HOSPITAL | Age: 76
End: 2021-03-26

## 2021-03-26 DIAGNOSIS — Z01.818 PRE-OPERATIVE CLEARANCE: ICD-10-CM

## 2021-03-26 PROCEDURE — C9803 HOPD COVID-19 SPEC COLLECT: HCPCS

## 2021-03-26 PROCEDURE — U0004 COV-19 TEST NON-CDC HGH THRU: HCPCS | Performed by: OPHTHALMOLOGY

## 2021-03-27 LAB — SARS-COV-2 RNA NOSE QL NAA+PROBE: NOT DETECTED

## 2021-04-12 ENCOUNTER — HOSPITAL ENCOUNTER (EMERGENCY)
Dept: HOSPITAL 79 - ER1 | Age: 76
LOS: 1 days | Discharge: HOME | End: 2021-04-13
Payer: COMMERCIAL

## 2021-04-12 DIAGNOSIS — Z87.440: ICD-10-CM

## 2021-04-12 DIAGNOSIS — I10: ICD-10-CM

## 2021-04-12 DIAGNOSIS — E87.6: Primary | ICD-10-CM

## 2021-04-12 LAB
BUN/CREATININE RATIO: 15 (ref 0–10)
HGB BLD-MCNC: 11.6 GM/DL (ref 12.3–15.3)
RED BLOOD COUNT: 4.09 M/UL (ref 4–5.1)
WHITE BLOOD COUNT: 6.3 K/UL (ref 4.5–11)

## 2022-07-02 ENCOUNTER — APPOINTMENT (OUTPATIENT)
Dept: CT IMAGING | Facility: HOSPITAL | Age: 77
End: 2022-07-02

## 2022-07-02 ENCOUNTER — HOSPITAL ENCOUNTER (EMERGENCY)
Facility: HOSPITAL | Age: 77
Discharge: HOME | End: 2022-07-03
Attending: EMERGENCY MEDICINE | Admitting: EMERGENCY MEDICINE

## 2022-07-02 ENCOUNTER — APPOINTMENT (OUTPATIENT)
Dept: GENERAL RADIOLOGY | Facility: HOSPITAL | Age: 77
End: 2022-07-02

## 2022-07-02 DIAGNOSIS — Z51.5 HOSPICE CARE PATIENT: ICD-10-CM

## 2022-07-02 DIAGNOSIS — E83.42 HYPOMAGNESEMIA: ICD-10-CM

## 2022-07-02 DIAGNOSIS — E87.6 HYPOKALEMIA: Primary | ICD-10-CM

## 2022-07-02 DIAGNOSIS — N39.0 BACTERIAL UTI: ICD-10-CM

## 2022-07-02 DIAGNOSIS — A49.9 BACTERIAL UTI: ICD-10-CM

## 2022-07-02 LAB
ALBUMIN SERPL-MCNC: 2.77 G/DL (ref 3.5–5.2)
ALBUMIN/GLOB SERPL: 1.5 G/DL
ALP SERPL-CCNC: 65 U/L (ref 39–117)
ALT SERPL W P-5'-P-CCNC: 46 U/L (ref 1–33)
ANION GAP SERPL CALCULATED.3IONS-SCNC: 9.9 MMOL/L (ref 5–15)
APTT PPP: 29.6 SECONDS (ref 26.5–34.5)
AST SERPL-CCNC: 33 U/L (ref 1–32)
BACTERIA UR QL AUTO: ABNORMAL /HPF
BASOPHILS # BLD AUTO: 0.01 10*3/MM3 (ref 0–0.2)
BASOPHILS NFR BLD AUTO: 0.1 % (ref 0–1.5)
BILIRUB SERPL-MCNC: 0.2 MG/DL (ref 0–1.2)
BILIRUB UR QL STRIP: NEGATIVE
BUN SERPL-MCNC: 14 MG/DL (ref 8–23)
BUN/CREAT SERPL: 29.2 (ref 7–25)
CALCIUM SPEC-SCNC: 7 MG/DL (ref 8.6–10.5)
CHLORIDE SERPL-SCNC: 113 MMOL/L (ref 98–107)
CK SERPL-CCNC: 47 U/L (ref 20–180)
CLARITY UR: ABNORMAL
CO2 SERPL-SCNC: 20.1 MMOL/L (ref 22–29)
COLOR UR: YELLOW
CREAT SERPL-MCNC: 0.48 MG/DL (ref 0.57–1)
CRP SERPL-MCNC: <0.3 MG/DL (ref 0–0.5)
D-LACTATE SERPL-SCNC: 2.4 MMOL/L (ref 0.5–2)
DEPRECATED RDW RBC AUTO: 44.7 FL (ref 37–54)
EGFRCR SERPLBLD CKD-EPI 2021: 97.1 ML/MIN/1.73
EOSINOPHIL # BLD AUTO: 0 10*3/MM3 (ref 0–0.4)
EOSINOPHIL NFR BLD AUTO: 0 % (ref 0.3–6.2)
ERYTHROCYTE [DISTWIDTH] IN BLOOD BY AUTOMATED COUNT: 12.3 % (ref 12.3–15.4)
ERYTHROCYTE [SEDIMENTATION RATE] IN BLOOD: <1 MM/HR (ref 0–30)
FLUAV SUBTYP SPEC NAA+PROBE: NOT DETECTED
FLUBV RNA ISLT QL NAA+PROBE: NOT DETECTED
GLOBULIN UR ELPH-MCNC: 1.8 GM/DL
GLUCOSE SERPL-MCNC: 136 MG/DL (ref 65–99)
GLUCOSE UR STRIP-MCNC: NEGATIVE MG/DL
HCT VFR BLD AUTO: 23.3 % (ref 34–46.6)
HGB BLD-MCNC: 7.8 G/DL (ref 12–15.9)
HGB UR QL STRIP.AUTO: NEGATIVE
HYALINE CASTS UR QL AUTO: ABNORMAL /LPF
IMM GRANULOCYTES # BLD AUTO: 0.08 10*3/MM3 (ref 0–0.05)
IMM GRANULOCYTES NFR BLD AUTO: 1 % (ref 0–0.5)
INR PPP: 1.15 (ref 0.9–1.1)
KETONES UR QL STRIP: NEGATIVE
LEUKOCYTE ESTERASE UR QL STRIP.AUTO: ABNORMAL
LYMPHOCYTES # BLD AUTO: 0.27 10*3/MM3 (ref 0.7–3.1)
LYMPHOCYTES NFR BLD AUTO: 3.2 % (ref 19.6–45.3)
MAGNESIUM SERPL-MCNC: 1.4 MG/DL (ref 1.6–2.4)
MCH RBC QN AUTO: 33.6 PG (ref 26.6–33)
MCHC RBC AUTO-ENTMCNC: 33.5 G/DL (ref 31.5–35.7)
MCV RBC AUTO: 100.4 FL (ref 79–97)
MONOCYTES # BLD AUTO: 0.22 10*3/MM3 (ref 0.1–0.9)
MONOCYTES NFR BLD AUTO: 2.6 % (ref 5–12)
NEUTROPHILS NFR BLD AUTO: 7.81 10*3/MM3 (ref 1.7–7)
NEUTROPHILS NFR BLD AUTO: 93.1 % (ref 42.7–76)
NITRITE UR QL STRIP: POSITIVE
NRBC BLD AUTO-RTO: 0 /100 WBC (ref 0–0.2)
NT-PROBNP SERPL-MCNC: 878.3 PG/ML (ref 0–1800)
PH UR STRIP.AUTO: 7.5 [PH] (ref 5–8)
PLATELET # BLD AUTO: 102 10*3/MM3 (ref 140–450)
PMV BLD AUTO: 10.3 FL (ref 6–12)
POTASSIUM SERPL-SCNC: 2.6 MMOL/L (ref 3.5–5.2)
PROCALCITONIN SERPL-MCNC: 0.07 NG/ML (ref 0–0.25)
PROT SERPL-MCNC: 4.6 G/DL (ref 6–8.5)
PROT UR QL STRIP: ABNORMAL
PROTHROMBIN TIME: 15 SECONDS (ref 12.1–14.7)
QT INTERVAL: 326 MS
QTC INTERVAL: 396 MS
RBC # BLD AUTO: 2.32 10*6/MM3 (ref 3.77–5.28)
RBC # UR STRIP: ABNORMAL /HPF
REF LAB TEST METHOD: ABNORMAL
SARS-COV-2 RNA PNL SPEC NAA+PROBE: NOT DETECTED
SODIUM SERPL-SCNC: 143 MMOL/L (ref 136–145)
SP GR UR STRIP: 1.01 (ref 1–1.03)
SQUAMOUS #/AREA URNS HPF: ABNORMAL /HPF
T4 FREE SERPL-MCNC: 0.75 NG/DL (ref 0.93–1.7)
TROPONIN T SERPL-MCNC: <0.01 NG/ML (ref 0–0.03)
TROPONIN T SERPL-MCNC: <0.01 NG/ML (ref 0–0.03)
TSH SERPL DL<=0.05 MIU/L-ACNC: 15.5 UIU/ML (ref 0.27–4.2)
UROBILINOGEN UR QL STRIP: ABNORMAL
WBC # UR STRIP: ABNORMAL /HPF
WBC NRBC COR # BLD: 8.39 10*3/MM3 (ref 3.4–10.8)

## 2022-07-02 PROCEDURE — 25010000002 MAGNESIUM SULFATE 2 GM/50ML SOLUTION: Performed by: EMERGENCY MEDICINE

## 2022-07-02 PROCEDURE — 87040 BLOOD CULTURE FOR BACTERIA: CPT | Performed by: EMERGENCY MEDICINE

## 2022-07-02 PROCEDURE — 96365 THER/PROPH/DIAG IV INF INIT: CPT

## 2022-07-02 PROCEDURE — 25010000002 LORAZEPAM PER 2 MG: Performed by: EMERGENCY MEDICINE

## 2022-07-02 PROCEDURE — 80053 COMPREHEN METABOLIC PANEL: CPT | Performed by: EMERGENCY MEDICINE

## 2022-07-02 PROCEDURE — 0 POTASSIUM CHLORIDE 10 MEQ/100ML SOLUTION: Performed by: EMERGENCY MEDICINE

## 2022-07-02 PROCEDURE — 71045 X-RAY EXAM CHEST 1 VIEW: CPT | Performed by: RADIOLOGY

## 2022-07-02 PROCEDURE — 96375 TX/PRO/DX INJ NEW DRUG ADDON: CPT

## 2022-07-02 PROCEDURE — 85652 RBC SED RATE AUTOMATED: CPT | Performed by: EMERGENCY MEDICINE

## 2022-07-02 PROCEDURE — 84145 PROCALCITONIN (PCT): CPT | Performed by: EMERGENCY MEDICINE

## 2022-07-02 PROCEDURE — 74176 CT ABD & PELVIS W/O CONTRAST: CPT

## 2022-07-02 PROCEDURE — 83605 ASSAY OF LACTIC ACID: CPT | Performed by: EMERGENCY MEDICINE

## 2022-07-02 PROCEDURE — 85610 PROTHROMBIN TIME: CPT | Performed by: EMERGENCY MEDICINE

## 2022-07-02 PROCEDURE — 71045 X-RAY EXAM CHEST 1 VIEW: CPT

## 2022-07-02 PROCEDURE — 84439 ASSAY OF FREE THYROXINE: CPT | Performed by: EMERGENCY MEDICINE

## 2022-07-02 PROCEDURE — 99284 EMERGENCY DEPT VISIT MOD MDM: CPT

## 2022-07-02 PROCEDURE — 85730 THROMBOPLASTIN TIME PARTIAL: CPT | Performed by: EMERGENCY MEDICINE

## 2022-07-02 PROCEDURE — 83880 ASSAY OF NATRIURETIC PEPTIDE: CPT | Performed by: EMERGENCY MEDICINE

## 2022-07-02 PROCEDURE — 70450 CT HEAD/BRAIN W/O DYE: CPT

## 2022-07-02 PROCEDURE — 93010 ELECTROCARDIOGRAM REPORT: CPT | Performed by: INTERNAL MEDICINE

## 2022-07-02 PROCEDURE — 81001 URINALYSIS AUTO W/SCOPE: CPT | Performed by: EMERGENCY MEDICINE

## 2022-07-02 PROCEDURE — 93005 ELECTROCARDIOGRAM TRACING: CPT | Performed by: EMERGENCY MEDICINE

## 2022-07-02 PROCEDURE — 84484 ASSAY OF TROPONIN QUANT: CPT | Performed by: EMERGENCY MEDICINE

## 2022-07-02 PROCEDURE — 96367 TX/PROPH/DG ADDL SEQ IV INF: CPT

## 2022-07-02 PROCEDURE — 83735 ASSAY OF MAGNESIUM: CPT | Performed by: EMERGENCY MEDICINE

## 2022-07-02 PROCEDURE — 36415 COLL VENOUS BLD VENIPUNCTURE: CPT

## 2022-07-02 PROCEDURE — 87636 SARSCOV2 & INF A&B AMP PRB: CPT | Performed by: EMERGENCY MEDICINE

## 2022-07-02 PROCEDURE — 84443 ASSAY THYROID STIM HORMONE: CPT | Performed by: EMERGENCY MEDICINE

## 2022-07-02 PROCEDURE — P9612 CATHETERIZE FOR URINE SPEC: HCPCS

## 2022-07-02 PROCEDURE — 25010000002 CEFTRIAXONE PER 250 MG: Performed by: EMERGENCY MEDICINE

## 2022-07-02 PROCEDURE — 96368 THER/DIAG CONCURRENT INF: CPT

## 2022-07-02 PROCEDURE — 96366 THER/PROPH/DIAG IV INF ADDON: CPT

## 2022-07-02 PROCEDURE — 85025 COMPLETE CBC W/AUTO DIFF WBC: CPT | Performed by: EMERGENCY MEDICINE

## 2022-07-02 PROCEDURE — 86140 C-REACTIVE PROTEIN: CPT | Performed by: EMERGENCY MEDICINE

## 2022-07-02 PROCEDURE — 82550 ASSAY OF CK (CPK): CPT | Performed by: EMERGENCY MEDICINE

## 2022-07-02 PROCEDURE — 71250 CT THORAX DX C-: CPT

## 2022-07-02 RX ORDER — LIDOCAINE HYDROCHLORIDE 10 MG/ML
2 INJECTION, SOLUTION EPIDURAL; INFILTRATION; INTRACAUDAL; PERINEURAL ONCE
Status: COMPLETED | OUTPATIENT
Start: 2022-07-02 | End: 2022-07-02

## 2022-07-02 RX ORDER — POTASSIUM CHLORIDE 7.45 MG/ML
10 INJECTION INTRAVENOUS ONCE
Status: COMPLETED | OUTPATIENT
Start: 2022-07-02 | End: 2022-07-02

## 2022-07-02 RX ORDER — MAGNESIUM SULFATE HEPTAHYDRATE 40 MG/ML
2 INJECTION, SOLUTION INTRAVENOUS ONCE
Status: COMPLETED | OUTPATIENT
Start: 2022-07-02 | End: 2022-07-02

## 2022-07-02 RX ORDER — LORAZEPAM 2 MG/ML
0.25 INJECTION INTRAMUSCULAR ONCE
Status: COMPLETED | OUTPATIENT
Start: 2022-07-02 | End: 2022-07-02

## 2022-07-02 RX ORDER — CEFDINIR 300 MG/1
300 CAPSULE ORAL 2 TIMES DAILY
Qty: 20 CAPSULE | Refills: 0 | Status: SHIPPED | OUTPATIENT
Start: 2022-07-02 | End: 2022-07-12

## 2022-07-02 RX ORDER — POTASSIUM CHLORIDE 7.45 MG/ML
10 INJECTION INTRAVENOUS ONCE
Status: COMPLETED | OUTPATIENT
Start: 2022-07-02 | End: 2022-07-03

## 2022-07-02 RX ORDER — SODIUM CHLORIDE 0.9 % (FLUSH) 0.9 %
10 SYRINGE (ML) INJECTION AS NEEDED
Status: DISCONTINUED | OUTPATIENT
Start: 2022-07-02 | End: 2022-07-03 | Stop reason: HOSPADM

## 2022-07-02 RX ADMIN — LORAZEPAM 0.25 MG: 2 INJECTION INTRAMUSCULAR at 16:59

## 2022-07-02 RX ADMIN — LIDOCAINE HYDROCHLORIDE 2 ML: 10 INJECTION, SOLUTION EPIDURAL; INFILTRATION; INTRACAUDAL; PERINEURAL at 23:42

## 2022-07-02 RX ADMIN — POTASSIUM CHLORIDE 10 MEQ: 7.46 INJECTION, SOLUTION INTRAVENOUS at 21:48

## 2022-07-02 RX ADMIN — MAGNESIUM SULFATE HEPTAHYDRATE 2 G: 2 INJECTION, SOLUTION INTRAVENOUS at 18:41

## 2022-07-02 RX ADMIN — SODIUM CHLORIDE 1000 ML: 9 INJECTION, SOLUTION INTRAVENOUS at 15:51

## 2022-07-02 RX ADMIN — POTASSIUM CHLORIDE 10 MEQ: 7.46 INJECTION, SOLUTION INTRAVENOUS at 23:05

## 2022-07-02 RX ADMIN — POTASSIUM CHLORIDE 10 MEQ: 10 INJECTION, SOLUTION INTRAVENOUS at 20:48

## 2022-07-02 RX ADMIN — POTASSIUM CHLORIDE 10 MEQ: 10 INJECTION, SOLUTION INTRAVENOUS at 19:58

## 2022-07-02 RX ADMIN — POTASSIUM CHLORIDE 10 MEQ: 7.46 INJECTION, SOLUTION INTRAVENOUS at 18:41

## 2022-07-02 RX ADMIN — SODIUM CHLORIDE 2 G: 900 INJECTION INTRAVENOUS at 16:58

## 2022-07-02 NOTE — ED NOTES
Pt.'s family left and gave their number, they stated to call them if anything happens or if we get transport back to the nursing home.    Malika Javed [phone]

## 2022-07-02 NOTE — ED PROVIDER NOTES
Subjective     History provided by:  Patient   used: No    Shortness of Breath  Severity:  Mild  Onset quality:  Sudden  Timing:  Constant  Progression:  Worsening  Chronicity:  New  Context: not activity, not animal exposure, not emotional upset, not fumes, not known allergens, not occupational exposure, not pollens, not smoke exposure, not strong odors, not URI and not weather changes    Relieved by:  Nothing  Worsened by:  Nothing  Ineffective treatments:  None tried  Associated symptoms: no abdominal pain, no chest pain, no claudication, no cough, no diaphoresis, no ear pain, no fever, no headaches, no hemoptysis, no neck pain, no PND, no rash, no sore throat, no sputum production, no syncope, no swollen glands, no vomiting and no wheezing    Risk factors: no recent alcohol use, no family hx of DVT, no hx of cancer, no hx of PE/DVT, no obesity, no oral contraceptive use, no prolonged immobilization, no recent surgery and no tobacco use        Review of Systems   Unable to perform ROS: Acuity of condition   Constitutional: Negative for diaphoresis and fever.   HENT: Negative for ear pain and sore throat.    Respiratory: Positive for shortness of breath. Negative for cough, hemoptysis, sputum production and wheezing.    Cardiovascular: Negative for chest pain, claudication, syncope and PND.   Gastrointestinal: Negative for abdominal pain and vomiting.   Musculoskeletal: Negative for neck pain.   Skin: Negative for rash.   Neurological: Negative for headaches.   All other systems reviewed and are negative.      Past Medical History:   Diagnosis Date   • Arthritis    • Depression    • Diastolic dysfunction    • Glaucoma    • Heart murmur    • Hypertension    • Knee pain, left    • Mild pulmonary hypertension (CMS/HCC)        No Known Allergies    Past Surgical History:   Procedure Laterality Date   • CATARACT EXTRACTION Left     with stent placement for glaucoma   • TOTAL KNEE ARTHROPLASTY Left  7/17/2018    Procedure: TOTAL KNEE ARTHROPLASTY;  Surgeon: Ignacio Moreno MD;  Location: [facility];  Service: Orthopedics       Family History   Problem Relation Age of Onset   • Rheum arthritis Mother    • Hypertension Mother    • Rheum arthritis Father    • Gout Sister    • Gout Brother        Social History     Socioeconomic History   • Marital status:    Tobacco Use   • Smoking status: Never Smoker   • Smokeless tobacco: Never Used   Substance and Sexual Activity   • Alcohol use: No   • Drug use: No   • Sexual activity: Defer           Objective   Physical Exam  Vitals and nursing note reviewed.   Constitutional:       General: She is not in acute distress.     Appearance: She is well-developed. She is ill-appearing. She is not toxic-appearing or diaphoretic.   HENT:      Head: Normocephalic and atraumatic.      Right Ear: External ear normal.      Left Ear: External ear normal.      Nose: Nose normal.      Mouth/Throat:      Pharynx: No oropharyngeal exudate.      Tonsils: No tonsillar exudate.   Eyes:      General: Lids are normal.      Conjunctiva/sclera: Conjunctivae normal.      Pupils: Pupils are equal, round, and reactive to light.   Neck:      Thyroid: No thyromegaly.   Cardiovascular:      Rate and Rhythm: Normal rate and regular rhythm.      Pulses: Normal pulses.      Heart sounds: Normal heart sounds, S1 normal and S2 normal.   Pulmonary:      Effort: Pulmonary effort is normal. No tachypnea or respiratory distress.      Breath sounds: Normal breath sounds. No decreased breath sounds, wheezing or rales.   Chest:      Chest wall: No tenderness.   Abdominal:      General: Bowel sounds are normal. There is no distension.      Palpations: Abdomen is soft.      Tenderness: There is no abdominal tenderness. There is no guarding or rebound.   Musculoskeletal:         General: No tenderness or deformity. Normal range of motion.      Cervical back: Full passive range of motion without pain,  normal range of motion and neck supple.   Lymphadenopathy:      Cervical: No cervical adenopathy.   Skin:     General: Skin is warm and dry.      Coloration: Skin is not pale.      Findings: No erythema or rash.   Neurological:      Mental Status: She is alert. She is disoriented.      GCS: GCS eye subscore is 4. GCS verbal subscore is 5. GCS motor subscore is 6.      Cranial Nerves: No cranial nerve deficit.      Sensory: No sensory deficit.   Psychiatric:         Speech: Speech normal.         Procedures           ED Course  ED Course as of 07/03/22 0737   Sat Jul 02, 2022   1557 XR Chest 1 View    IMPRESSION:  No radiographic evidence of acute cardiac or pulmonary disease. [ES]   1618 ECG 12 Lead  Vent. rate 89 BPM  OK interval 146 ms  QRS duration 68 ms  QT/QTcB 326/396 ms  P-R-T axes 60 41 -11  Normal sinus rhythm  Nonspecific ST and T wave abnormality  Abnormal ECG  When compared with ECG of 17-JUL-2018 16:26,  Nonspecific T wave abnormality, worse in Inferior leads  Nonspecific T wave abnormality now evident in Lateral leads [ES]   1822 CT Head Without Contrast  IMPRESSION:     Patient positioning is suboptimal and there is motion degradation. Allowing for this:     1.  Senescent changes without definitive superimposed process. Subtle findings would not be detected by this study. [ES]   1832 CT Chest Without Contrast Diagnostic  IMPRESSION:     Evaluation of soft tissues is compromised by lack of IV contrast. Additionally, patient positioning and motion degradation further compromises evaluation. Additionally, patient's arms are within the gantry further limiting evaluation of the upper  abdominal viscera. Degradation is moderate to severe. Allowing for this:     1.  Bandlike scarring or atelectasis at the right base. Developing infiltrate could have an identical appearance. Favor subtle groundglass opacities and reticular nodularity scattered throughout both lungs, nonspecific and  possibly  infectious/inflammatory. There are areas of probable scarring at both lung apices.  2.  Age-indeterminate T5 and T8 compression fractures.  3.  A 6 mm pulmonary nodule the lingula can be reassessed in 12 months.  4.  Markedly limited evaluation of the abdomen/pelvis. Large amount of stool in the rectum. Favor rectal wall thickening and adjacent fat stranding. Proctitis or rectal fecal impaction are both considerations. [ES]   1832 Hospitalist team came to the ER at bedside had long discussion with family about prolonging patient's life but not changing her quality of life with admission and electrolyte replacement at this time.  Family has decided to send the patient back to the nursing home where she is under the care of hospice.  Again admission was offered, but family has declined at this time and wishes that the patient return to the nursing home for hospice care. [ES]      ED Course User Index  [ES] Timbo Ledesma MD                                           MDM  Number of Diagnoses or Management Options  Bacterial UTI: new and requires workup  Hospice care patient: established and worsening  Hypokalemia: new and requires workup  Hypomagnesemia: new and requires workup     Amount and/or Complexity of Data Reviewed  Clinical lab tests: reviewed and ordered  Tests in the radiology section of CPT®: reviewed and ordered  Tests in the medicine section of CPT®: reviewed and ordered  Review and summarize past medical records: yes  Independent visualization of images, tracings, or specimens: yes    Risk of Complications, Morbidity, and/or Mortality  Presenting problems: moderate  Diagnostic procedures: moderate  Management options: moderate    Patient Progress  Patient progress: stable      Final diagnoses:   Hypokalemia   Hypomagnesemia   Bacterial UTI   Hospice care patient       ED Disposition  ED Disposition     ED Disposition   Discharge    Condition   Stable    Comment   --              Miki Bay MD  [address] [place] [id number]  [phone]    Schedule an appointment as soon as possible for a visit in 1 day           Medication List      New Prescriptions    cefdinir 300 MG capsule  Commonly known as: OMNICEF  Take 1 capsule by mouth 2 (Two) Times a Day for 10 days.        Changed    * HYDROcodone-acetaminophen 7.5-325 MG per tablet  Commonly known as: NORCO  Take 1 tablet by mouth Every 6 (Six) Hours As Needed for Moderate Pain .  What changed:   · how much to take  · when to take this     * HYDROcodone-acetaminophen 7.5-325 MG per tablet  Commonly known as: NORCO  Take 1 tablet by mouth Every 6 (Six) Hours As Needed for Moderate Pain .  What changed: Another medication with the same name was changed. Make sure you understand how and when to take each.         * This list has 2 medication(s) that are the same as other medications prescribed for you. Read the directions carefully, and ask your doctor or other care provider to review them with you.               Where to Get Your Medications      You can get these medications from any pharmacy    Bring a paper prescription for each of these medications  · cefdinir 300 MG capsule          Timbo Ledesma MD  07/03/22 0737

## 2022-07-03 VITALS
RESPIRATION RATE: 20 BRPM | TEMPERATURE: 97.9 F | BODY MASS INDEX: 12.5 KG/M2 | HEART RATE: 79 BPM | HEIGHT: 65 IN | SYSTOLIC BLOOD PRESSURE: 153 MMHG | OXYGEN SATURATION: 98 % | DIASTOLIC BLOOD PRESSURE: 87 MMHG | WEIGHT: 75 LBS

## 2022-07-03 PROCEDURE — 0 POTASSIUM CHLORIDE 10 MEQ/100ML SOLUTION: Performed by: EMERGENCY MEDICINE

## 2022-07-03 PROCEDURE — 96366 THER/PROPH/DIAG IV INF ADDON: CPT

## 2022-07-03 RX ADMIN — POTASSIUM CHLORIDE 10 MEQ: 7.46 INJECTION, SOLUTION INTRAVENOUS at 00:13

## 2022-07-07 LAB
BACTERIA SPEC AEROBE CULT: NORMAL
BACTERIA SPEC AEROBE CULT: NORMAL

## (undated) DEVICE — DRSNG TELFA PAD NONADH STR 1S 3X8IN

## (undated) DEVICE — HANDPIECE SET WITH COAXIAL HIGH FLOW TIP AND SUCTION TUBE: Brand: INTERPULSE

## (undated) DEVICE — SIGMA LCS HIGH PERFORMANCE INSTRUMENTS STERILE QUICK DRILL PINS: Brand: SIGMA LCS HIGH PERFORMANCE

## (undated) DEVICE — SIGMA LCS HIGH PERFORMANCE STERILE THREADED HEADED PINS: Brand: SIGMA LCS HIGH PERFORMANCE

## (undated) DEVICE — APPL CHLORAPREP W/TINT 26ML ORNG

## (undated) DEVICE — NDL HYPO ECLPS SFTY 18G 1 1/2IN

## (undated) DEVICE — SUT ETHIB NO 2 X519H

## (undated) DEVICE — KNEE HOLDER DISPOSABLE LINER: Brand: ALVARADO®  KNEE SUPPORT

## (undated) DEVICE — PK KN TOTL ADDON 70

## (undated) DEVICE — ENCORE® LATEX MICRO SIZE 7.5, STERILE LATEX POWDER-FREE SURGICAL GLOVE: Brand: ENCORE

## (undated) DEVICE — SUT MNCRYL PLS ANTIB UD 2/0 CP1 27IN

## (undated) DEVICE — 4-PORT MANIFOLD: Brand: NEPTUNE 2

## (undated) DEVICE — SPNG GZ STRL 2S 4X4 12PLY

## (undated) DEVICE — PK KN TOTL 70

## (undated) DEVICE — WRP COMPR KN COLD UNIV

## (undated) DEVICE — SYS SKIN CLS DERMABOND PRINEO W/22CM MESH TP

## (undated) DEVICE — IMMOB KN 3PNL DLX CANVS 19IN BLU

## (undated) DEVICE — BOWL AND CEMENT CARTRIDGE WITH BREAKAWAY FEMORAL NOZZLE AND MEDIUM PRESSURIZER: Brand: ACM

## (undated) DEVICE — BNDG ELAS CO-FLEX SLF ADHR 6IN 5YD LF STRL

## (undated) DEVICE — HOLDER: Brand: DEROYAL

## (undated) DEVICE — DISPOSABLE TOURNIQUET CUFF SINGLE BLADDER, SINGLE PORT AND LUER LOCK CONNECTOR: Brand: COLOR CUFF

## (undated) DEVICE — LARGE RECIP BLADE, OFFSET, 12.7MM X 68MM X 1.15MM: Brand: MICROAIRE®

## (undated) DEVICE — STRYKER PERFORMANCE SERIES SAGITTAL BLADE: Brand: STRYKER PERFORMANCE SERIES